# Patient Record
Sex: FEMALE | Race: WHITE | Employment: FULL TIME | URBAN - METROPOLITAN AREA
[De-identification: names, ages, dates, MRNs, and addresses within clinical notes are randomized per-mention and may not be internally consistent; named-entity substitution may affect disease eponyms.]

---

## 2018-05-21 PROBLEM — E03.9 ACQUIRED HYPOTHYROIDISM: Status: ACTIVE | Noted: 2018-05-21

## 2018-05-21 PROBLEM — G89.29 CHRONIC CERVICAL PAIN: Status: ACTIVE | Noted: 2018-05-21

## 2018-05-21 PROBLEM — M54.2 CHRONIC CERVICAL PAIN: Status: ACTIVE | Noted: 2018-05-21

## 2018-07-03 PROBLEM — E66.01 SEVERE OBESITY (BMI 35.0-39.9): Status: ACTIVE | Noted: 2018-07-03

## 2018-09-21 ENCOUNTER — HOSPITAL ENCOUNTER (OUTPATIENT)
Dept: MAMMOGRAPHY | Age: 58
Discharge: HOME | End: 2018-09-21
Attending: FAMILY MEDICINE
Payer: COMMERCIAL

## 2018-09-21 DIAGNOSIS — Z12.39 ENCOUNTER FOR SCREENING FOR MALIGNANT NEOPLASM OF BREAST: ICD-10-CM

## 2018-09-21 PROCEDURE — 77063 BREAST TOMOSYNTHESIS BI: CPT

## 2019-02-05 ENCOUNTER — HOSPITAL ENCOUNTER (INPATIENT)
Age: 59
LOS: 3 days | Discharge: HOME | DRG: 446 | End: 2019-02-08
Attending: EMERGENCY MEDICINE | Admitting: HOSPITALIST
Payer: COMMERCIAL

## 2019-02-05 ENCOUNTER — APPOINTMENT (OUTPATIENT)
Dept: ULTRASOUND IMAGING | Age: 59
DRG: 446 | End: 2019-02-05
Attending: EMERGENCY MEDICINE
Payer: COMMERCIAL

## 2019-02-05 ENCOUNTER — HOSPITAL ENCOUNTER (OUTPATIENT)
Dept: CT IMAGING | Age: 59
Discharge: HOME | End: 2019-02-05
Attending: FAMILY MEDICINE
Payer: COMMERCIAL

## 2019-02-05 DIAGNOSIS — K80.42 CHOLEDOCHOLITHIASIS WITH ACUTE CHOLECYSTITIS: Primary | ICD-10-CM

## 2019-02-05 DIAGNOSIS — R10.84 GENERALIZED ABDOMINAL PAIN: ICD-10-CM

## 2019-02-05 PROBLEM — R10.9 ABDOMINAL PAIN: Status: ACTIVE | Noted: 2019-02-05

## 2019-02-05 LAB
ALBUMIN SERPL-MCNC: 3.4 G/DL (ref 3.5–5)
ALBUMIN/GLOB SERPL: 0.9 {RATIO}
ALP SERPL-CCNC: 396 U/L (ref 50–130)
ALT SERPL-CCNC: 653 U/L (ref 12–65)
ANION GAP SERPL CALC-SCNC: 10 MMOL/L
AST SERPL-CCNC: 328 U/L (ref 15–37)
BASOPHILS # BLD: 0.1 K/UL (ref 0–0.2)
BASOPHILS NFR BLD: 1 % (ref 0–2)
BILIRUB SERPL-MCNC: 5.1 MG/DL (ref 0.2–1.1)
BUN SERPL-MCNC: 9 MG/DL (ref 6–23)
CALCIUM SERPL-MCNC: 9 MG/DL (ref 8.3–10.4)
CHLORIDE SERPL-SCNC: 103 MMOL/L (ref 98–107)
CO2 SERPL-SCNC: 26 MMOL/L (ref 21–32)
CREAT SERPL-MCNC: 0.75 MG/DL (ref 0.6–1)
DIFFERENTIAL METHOD BLD: ABNORMAL
EOSINOPHIL # BLD: 0.3 K/UL (ref 0–0.8)
EOSINOPHIL NFR BLD: 6 % (ref 0.5–7.8)
ERYTHROCYTE [DISTWIDTH] IN BLOOD BY AUTOMATED COUNT: 12.8 % (ref 11.9–14.6)
GLOBULIN SER CALC-MCNC: 3.7 G/DL (ref 2.3–3.5)
GLUCOSE SERPL-MCNC: 89 MG/DL (ref 65–100)
HCT VFR BLD AUTO: 45.9 % (ref 35.8–46.3)
HGB BLD-MCNC: 15.4 G/DL (ref 11.7–15.4)
IMM GRANULOCYTES # BLD AUTO: 0 K/UL (ref 0–0.5)
IMM GRANULOCYTES NFR BLD AUTO: 1 % (ref 0–5)
LIPASE SERPL-CCNC: 157 U/L (ref 73–393)
LYMPHOCYTES # BLD: 1.8 K/UL (ref 0.5–4.6)
LYMPHOCYTES NFR BLD: 30 % (ref 13–44)
MCH RBC QN AUTO: 32.8 PG (ref 26.1–32.9)
MCHC RBC AUTO-ENTMCNC: 33.6 G/DL (ref 31.4–35)
MCV RBC AUTO: 97.9 FL (ref 79.6–97.8)
MONOCYTES # BLD: 0.4 K/UL (ref 0.1–1.3)
MONOCYTES NFR BLD: 7 % (ref 4–12)
NEUTS SEG # BLD: 3.4 K/UL (ref 1.7–8.2)
NEUTS SEG NFR BLD: 56 % (ref 43–78)
NRBC # BLD: 0 K/UL (ref 0–0.2)
PLATELET # BLD AUTO: 256 K/UL (ref 150–450)
PMV BLD AUTO: 9.8 FL (ref 9.4–12.3)
POTASSIUM SERPL-SCNC: 3.9 MMOL/L (ref 3.5–5.1)
PROT SERPL-MCNC: 7.1 G/DL
RBC # BLD AUTO: 4.69 M/UL (ref 4.05–5.2)
SODIUM SERPL-SCNC: 139 MMOL/L (ref 136–145)
WBC # BLD AUTO: 6 K/UL (ref 4.3–11.1)

## 2019-02-05 PROCEDURE — 83690 ASSAY OF LIPASE: CPT

## 2019-02-05 PROCEDURE — 80053 COMPREHEN METABOLIC PANEL: CPT

## 2019-02-05 PROCEDURE — 76705 ECHO EXAM OF ABDOMEN: CPT

## 2019-02-05 PROCEDURE — 99283 EMERGENCY DEPT VISIT LOW MDM: CPT | Performed by: EMERGENCY MEDICINE

## 2019-02-05 PROCEDURE — 74011000258 HC RX REV CODE- 258: Performed by: FAMILY MEDICINE

## 2019-02-05 PROCEDURE — 74011000258 HC RX REV CODE- 258: Performed by: EMERGENCY MEDICINE

## 2019-02-05 PROCEDURE — 74011636320 HC RX REV CODE- 636/320: Performed by: FAMILY MEDICINE

## 2019-02-05 PROCEDURE — 85025 COMPLETE CBC W/AUTO DIFF WBC: CPT

## 2019-02-05 PROCEDURE — 74011250636 HC RX REV CODE- 250/636: Performed by: EMERGENCY MEDICINE

## 2019-02-05 PROCEDURE — 65270000029 HC RM PRIVATE

## 2019-02-05 PROCEDURE — 74011250636 HC RX REV CODE- 250/636: Performed by: HOSPITALIST

## 2019-02-05 PROCEDURE — 74177 CT ABD & PELVIS W/CONTRAST: CPT

## 2019-02-05 RX ORDER — DEXTROSE, SODIUM CHLORIDE, SODIUM LACTATE, POTASSIUM CHLORIDE, AND CALCIUM CHLORIDE 5; .6; .31; .03; .02 G/100ML; G/100ML; G/100ML; G/100ML; G/100ML
125 INJECTION, SOLUTION INTRAVENOUS CONTINUOUS
Status: DISCONTINUED | OUTPATIENT
Start: 2019-02-05 | End: 2019-02-06

## 2019-02-05 RX ORDER — NALOXONE HYDROCHLORIDE 0.4 MG/ML
0.4 INJECTION, SOLUTION INTRAMUSCULAR; INTRAVENOUS; SUBCUTANEOUS AS NEEDED
Status: DISCONTINUED | OUTPATIENT
Start: 2019-02-05 | End: 2019-02-06 | Stop reason: HOSPADM

## 2019-02-05 RX ORDER — ONDANSETRON 2 MG/ML
4 INJECTION INTRAMUSCULAR; INTRAVENOUS
Status: DISCONTINUED | OUTPATIENT
Start: 2019-02-05 | End: 2019-02-06 | Stop reason: HOSPADM

## 2019-02-05 RX ORDER — SODIUM CHLORIDE 0.9 % (FLUSH) 0.9 %
5-40 SYRINGE (ML) INJECTION EVERY 8 HOURS
Status: DISCONTINUED | OUTPATIENT
Start: 2019-02-05 | End: 2019-02-06 | Stop reason: HOSPADM

## 2019-02-05 RX ORDER — AMOXICILLIN 250 MG
1 CAPSULE ORAL DAILY
Status: DISCONTINUED | OUTPATIENT
Start: 2019-02-06 | End: 2019-02-06

## 2019-02-05 RX ORDER — OXYCODONE AND ACETAMINOPHEN 5; 325 MG/1; MG/1
1 TABLET ORAL
Status: DISCONTINUED | OUTPATIENT
Start: 2019-02-05 | End: 2019-02-06 | Stop reason: HOSPADM

## 2019-02-05 RX ORDER — SODIUM CHLORIDE 0.9 % (FLUSH) 0.9 %
10 SYRINGE (ML) INJECTION
Status: COMPLETED | OUTPATIENT
Start: 2019-02-05 | End: 2019-02-05

## 2019-02-05 RX ORDER — ACETAMINOPHEN 325 MG/1
650 TABLET ORAL
Status: DISCONTINUED | OUTPATIENT
Start: 2019-02-05 | End: 2019-02-06 | Stop reason: HOSPADM

## 2019-02-05 RX ORDER — MORPHINE SULFATE 2 MG/ML
1 INJECTION, SOLUTION INTRAMUSCULAR; INTRAVENOUS
Status: DISCONTINUED | OUTPATIENT
Start: 2019-02-05 | End: 2019-02-06 | Stop reason: HOSPADM

## 2019-02-05 RX ORDER — SODIUM CHLORIDE 0.9 % (FLUSH) 0.9 %
5-40 SYRINGE (ML) INJECTION AS NEEDED
Status: DISCONTINUED | OUTPATIENT
Start: 2019-02-05 | End: 2019-02-06 | Stop reason: HOSPADM

## 2019-02-05 RX ORDER — ENOXAPARIN SODIUM 100 MG/ML
40 INJECTION SUBCUTANEOUS EVERY 24 HOURS
Status: DISCONTINUED | OUTPATIENT
Start: 2019-02-05 | End: 2019-02-06 | Stop reason: HOSPADM

## 2019-02-05 RX ADMIN — IOPAMIDOL 100 ML: 755 INJECTION, SOLUTION INTRAVENOUS at 14:41

## 2019-02-05 RX ADMIN — DIATRIZOATE MEGLUMINE AND DIATRIZOATE SODIUM 15 ML: 660; 100 LIQUID ORAL; RECTAL at 14:41

## 2019-02-05 RX ADMIN — Medication 10 ML: at 21:47

## 2019-02-05 RX ADMIN — Medication 10 ML: at 14:41

## 2019-02-05 RX ADMIN — SODIUM CHLORIDE 100 ML: 900 INJECTION, SOLUTION INTRAVENOUS at 14:41

## 2019-02-05 RX ADMIN — PIPERACILLIN SODIUM,TAZOBACTAM SODIUM 4.5 G: 4; .5 INJECTION, POWDER, FOR SOLUTION INTRAVENOUS at 19:42

## 2019-02-05 RX ADMIN — SODIUM CHLORIDE, SODIUM LACTATE, POTASSIUM CHLORIDE, CALCIUM CHLORIDE, AND DEXTROSE MONOHYDRATE 125 ML/HR: 600; 310; 30; 20; 5 INJECTION, SOLUTION INTRAVENOUS at 21:53

## 2019-02-05 RX ADMIN — ENOXAPARIN SODIUM 40 MG: 40 INJECTION SUBCUTANEOUS at 21:47

## 2019-02-05 NOTE — ED TRIAGE NOTES
Pt in states she was sent from ct for her gallbladder. States she has had abdominal pain and nausea since Saturday.

## 2019-02-06 ENCOUNTER — APPOINTMENT (OUTPATIENT)
Dept: GENERAL RADIOLOGY | Age: 59
End: 2019-02-06
Attending: INTERNAL MEDICINE
Payer: COMMERCIAL

## 2019-02-06 ENCOUNTER — ANESTHESIA (OUTPATIENT)
Dept: ENDOSCOPY | Age: 59
End: 2019-02-06
Payer: COMMERCIAL

## 2019-02-06 ENCOUNTER — HOSPITAL ENCOUNTER (OUTPATIENT)
Age: 59
Setting detail: OUTPATIENT SURGERY
Discharge: OTHER INSTITUTION | End: 2019-02-06
Attending: INTERNAL MEDICINE | Admitting: INTERNAL MEDICINE
Payer: COMMERCIAL

## 2019-02-06 ENCOUNTER — ANESTHESIA EVENT (OUTPATIENT)
Dept: ENDOSCOPY | Age: 59
End: 2019-02-06
Payer: COMMERCIAL

## 2019-02-06 VITALS
SYSTOLIC BLOOD PRESSURE: 144 MMHG | HEART RATE: 72 BPM | TEMPERATURE: 97.7 F | RESPIRATION RATE: 14 BRPM | DIASTOLIC BLOOD PRESSURE: 86 MMHG | OXYGEN SATURATION: 94 %

## 2019-02-06 LAB
ALBUMIN SERPL-MCNC: 2.9 G/DL (ref 3.5–5)
ALBUMIN/GLOB SERPL: 0.9 {RATIO}
ALP SERPL-CCNC: 348 U/L (ref 50–130)
ALT SERPL-CCNC: 512 U/L (ref 12–65)
ANION GAP SERPL CALC-SCNC: 7 MMOL/L
AST SERPL-CCNC: 226 U/L (ref 15–37)
BILIRUB DIRECT SERPL-MCNC: 3.1 MG/DL
BILIRUB SERPL-MCNC: 4 MG/DL (ref 0.2–1.1)
BUN SERPL-MCNC: 10 MG/DL (ref 6–23)
CALCIUM SERPL-MCNC: 8.7 MG/DL (ref 8.3–10.4)
CHLORIDE SERPL-SCNC: 106 MMOL/L (ref 98–107)
CO2 SERPL-SCNC: 29 MMOL/L (ref 21–32)
CREAT SERPL-MCNC: 0.81 MG/DL (ref 0.6–1)
ERYTHROCYTE [DISTWIDTH] IN BLOOD BY AUTOMATED COUNT: 13 % (ref 11.9–14.6)
GLOBULIN SER CALC-MCNC: 3.1 G/DL (ref 2.3–3.5)
GLUCOSE SERPL-MCNC: 97 MG/DL (ref 65–100)
HCT VFR BLD AUTO: 44.8 % (ref 35.8–46.3)
HGB BLD-MCNC: 14.6 G/DL (ref 11.7–15.4)
MCH RBC QN AUTO: 32.1 PG (ref 26.1–32.9)
MCHC RBC AUTO-ENTMCNC: 32.6 G/DL (ref 31.4–35)
MCV RBC AUTO: 98.5 FL (ref 79.6–97.8)
NRBC # BLD: 0 K/UL (ref 0–0.2)
PLATELET # BLD AUTO: 220 K/UL (ref 150–450)
PMV BLD AUTO: 10.2 FL (ref 9.4–12.3)
POTASSIUM SERPL-SCNC: 3.6 MMOL/L (ref 3.5–5.1)
PROT SERPL-MCNC: 6 G/DL
RBC # BLD AUTO: 4.55 M/UL (ref 4.05–5.2)
SODIUM SERPL-SCNC: 142 MMOL/L (ref 136–145)
WBC # BLD AUTO: 5.4 K/UL (ref 4.3–11.1)

## 2019-02-06 PROCEDURE — 74011000250 HC RX REV CODE- 250

## 2019-02-06 PROCEDURE — 74330 X-RAY BILE/PANC ENDOSCOPY: CPT

## 2019-02-06 PROCEDURE — 80048 BASIC METABOLIC PNL TOTAL CA: CPT

## 2019-02-06 PROCEDURE — 74011250636 HC RX REV CODE- 250/636: Performed by: FAMILY MEDICINE

## 2019-02-06 PROCEDURE — 80076 HEPATIC FUNCTION PANEL: CPT

## 2019-02-06 PROCEDURE — 74011250637 HC RX REV CODE- 250/637: Performed by: INTERNAL MEDICINE

## 2019-02-06 PROCEDURE — 76040000026: Performed by: INTERNAL MEDICINE

## 2019-02-06 PROCEDURE — 85027 COMPLETE CBC AUTOMATED: CPT

## 2019-02-06 PROCEDURE — 77030012595 HC SPHNTOM BILI BSC -D: Performed by: INTERNAL MEDICINE

## 2019-02-06 PROCEDURE — 74011250637 HC RX REV CODE- 250/637: Performed by: HOSPITALIST

## 2019-02-06 PROCEDURE — 77030032490 HC SLV COMPR SCD KNE COVD -B: Performed by: INTERNAL MEDICINE

## 2019-02-06 PROCEDURE — 65270000029 HC RM PRIVATE

## 2019-02-06 PROCEDURE — 77030020263 HC SOL INJ SOD CL0.9% LFCR 1000ML

## 2019-02-06 PROCEDURE — 74011250636 HC RX REV CODE- 250/636: Performed by: ANESTHESIOLOGY

## 2019-02-06 PROCEDURE — 74011250636 HC RX REV CODE- 250/636: Performed by: HOSPITALIST

## 2019-02-06 PROCEDURE — 74011250636 HC RX REV CODE- 250/636

## 2019-02-06 PROCEDURE — 76060000033 HC ANESTHESIA 1 TO 1.5 HR: Performed by: INTERNAL MEDICINE

## 2019-02-06 PROCEDURE — 74011000258 HC RX REV CODE- 258: Performed by: HOSPITALIST

## 2019-02-06 PROCEDURE — 77030007288 HC DEV LOK BILI BSC -A: Performed by: INTERNAL MEDICINE

## 2019-02-06 PROCEDURE — 36415 COLL VENOUS BLD VENIPUNCTURE: CPT

## 2019-02-06 PROCEDURE — 74011636320 HC RX REV CODE- 636/320: Performed by: INTERNAL MEDICINE

## 2019-02-06 PROCEDURE — 77030009038 HC CATH BILI STN RTVR BSC -C: Performed by: INTERNAL MEDICINE

## 2019-02-06 RX ORDER — OXYCODONE AND ACETAMINOPHEN 5; 325 MG/1; MG/1
1 TABLET ORAL
Status: CANCELLED | OUTPATIENT
Start: 2019-02-06

## 2019-02-06 RX ORDER — SODIUM CHLORIDE 0.9 % (FLUSH) 0.9 %
5-40 SYRINGE (ML) INJECTION EVERY 8 HOURS
Status: DISCONTINUED | OUTPATIENT
Start: 2019-02-06 | End: 2019-02-06 | Stop reason: HOSPADM

## 2019-02-06 RX ORDER — DEXTROSE MONOHYDRATE AND SODIUM CHLORIDE 5; .9 G/100ML; G/100ML
125 INJECTION, SOLUTION INTRAVENOUS CONTINUOUS
Status: DISCONTINUED | OUTPATIENT
Start: 2019-02-06 | End: 2019-02-06 | Stop reason: HOSPADM

## 2019-02-06 RX ORDER — ONDANSETRON 2 MG/ML
INJECTION INTRAMUSCULAR; INTRAVENOUS AS NEEDED
Status: DISCONTINUED | OUTPATIENT
Start: 2019-02-06 | End: 2019-02-06 | Stop reason: HOSPADM

## 2019-02-06 RX ORDER — NALOXONE HYDROCHLORIDE 0.4 MG/ML
0.4 INJECTION, SOLUTION INTRAMUSCULAR; INTRAVENOUS; SUBCUTANEOUS AS NEEDED
Status: CANCELLED | OUTPATIENT
Start: 2019-02-06

## 2019-02-06 RX ORDER — OXYCODONE AND ACETAMINOPHEN 7.5; 325 MG/1; MG/1
1 TABLET ORAL
Status: DISCONTINUED | OUTPATIENT
Start: 2019-02-06 | End: 2019-02-08 | Stop reason: HOSPADM

## 2019-02-06 RX ORDER — METOCLOPRAMIDE HYDROCHLORIDE 5 MG/ML
5 INJECTION INTRAMUSCULAR; INTRAVENOUS ONCE
Status: COMPLETED | OUTPATIENT
Start: 2019-02-06 | End: 2019-02-06

## 2019-02-06 RX ORDER — ENOXAPARIN SODIUM 100 MG/ML
40 INJECTION SUBCUTANEOUS EVERY 24 HOURS
Status: CANCELLED | OUTPATIENT
Start: 2019-02-06

## 2019-02-06 RX ORDER — SODIUM CHLORIDE 0.9 % (FLUSH) 0.9 %
5-40 SYRINGE (ML) INJECTION EVERY 8 HOURS
Status: CANCELLED | OUTPATIENT
Start: 2019-02-06

## 2019-02-06 RX ORDER — ENOXAPARIN SODIUM 100 MG/ML
40 INJECTION SUBCUTANEOUS EVERY 24 HOURS
Status: DISCONTINUED | OUTPATIENT
Start: 2019-02-06 | End: 2019-02-08 | Stop reason: HOSPADM

## 2019-02-06 RX ORDER — ONDANSETRON 2 MG/ML
4 INJECTION INTRAMUSCULAR; INTRAVENOUS
Status: CANCELLED | OUTPATIENT
Start: 2019-02-06

## 2019-02-06 RX ORDER — SODIUM CHLORIDE, SODIUM LACTATE, POTASSIUM CHLORIDE, CALCIUM CHLORIDE 600; 310; 30; 20 MG/100ML; MG/100ML; MG/100ML; MG/100ML
100 INJECTION, SOLUTION INTRAVENOUS CONTINUOUS
Status: DISCONTINUED | OUTPATIENT
Start: 2019-02-06 | End: 2019-02-06 | Stop reason: HOSPADM

## 2019-02-06 RX ORDER — PROPOFOL 10 MG/ML
INJECTION, EMULSION INTRAVENOUS AS NEEDED
Status: DISCONTINUED | OUTPATIENT
Start: 2019-02-06 | End: 2019-02-06 | Stop reason: HOSPADM

## 2019-02-06 RX ORDER — DEXAMETHASONE SODIUM PHOSPHATE 4 MG/ML
INJECTION, SOLUTION INTRA-ARTICULAR; INTRALESIONAL; INTRAMUSCULAR; INTRAVENOUS; SOFT TISSUE AS NEEDED
Status: DISCONTINUED | OUTPATIENT
Start: 2019-02-06 | End: 2019-02-06 | Stop reason: HOSPADM

## 2019-02-06 RX ORDER — SODIUM CHLORIDE 9 MG/ML
100 INJECTION, SOLUTION INTRAVENOUS CONTINUOUS
Status: DISPENSED | OUTPATIENT
Start: 2019-02-06 | End: 2019-02-08

## 2019-02-06 RX ORDER — LIDOCAINE HYDROCHLORIDE 20 MG/ML
INJECTION, SOLUTION EPIDURAL; INFILTRATION; INTRACAUDAL; PERINEURAL AS NEEDED
Status: DISCONTINUED | OUTPATIENT
Start: 2019-02-06 | End: 2019-02-06 | Stop reason: HOSPADM

## 2019-02-06 RX ORDER — MORPHINE SULFATE 2 MG/ML
1 INJECTION, SOLUTION INTRAMUSCULAR; INTRAVENOUS
Status: CANCELLED | OUTPATIENT
Start: 2019-02-06

## 2019-02-06 RX ORDER — SUCCINYLCHOLINE CHLORIDE 20 MG/ML
INJECTION INTRAMUSCULAR; INTRAVENOUS AS NEEDED
Status: DISCONTINUED | OUTPATIENT
Start: 2019-02-06 | End: 2019-02-06 | Stop reason: HOSPADM

## 2019-02-06 RX ORDER — ACETAMINOPHEN 325 MG/1
650 TABLET ORAL
Status: CANCELLED | OUTPATIENT
Start: 2019-02-06

## 2019-02-06 RX ORDER — ONDANSETRON 2 MG/ML
4 INJECTION INTRAMUSCULAR; INTRAVENOUS
Status: DISCONTINUED | OUTPATIENT
Start: 2019-02-06 | End: 2019-02-08 | Stop reason: HOSPADM

## 2019-02-06 RX ORDER — SODIUM CHLORIDE 0.9 % (FLUSH) 0.9 %
5-40 SYRINGE (ML) INJECTION AS NEEDED
Status: DISCONTINUED | OUTPATIENT
Start: 2019-02-06 | End: 2019-02-06 | Stop reason: HOSPADM

## 2019-02-06 RX ORDER — FENTANYL CITRATE 50 UG/ML
INJECTION, SOLUTION INTRAMUSCULAR; INTRAVENOUS AS NEEDED
Status: DISCONTINUED | OUTPATIENT
Start: 2019-02-06 | End: 2019-02-06 | Stop reason: HOSPADM

## 2019-02-06 RX ORDER — ROCURONIUM BROMIDE 10 MG/ML
INJECTION, SOLUTION INTRAVENOUS AS NEEDED
Status: DISCONTINUED | OUTPATIENT
Start: 2019-02-06 | End: 2019-02-06 | Stop reason: HOSPADM

## 2019-02-06 RX ORDER — DEXTROSE MONOHYDRATE AND SODIUM CHLORIDE 5; .9 G/100ML; G/100ML
125 INJECTION, SOLUTION INTRAVENOUS CONTINUOUS
Status: CANCELLED | OUTPATIENT
Start: 2019-02-06

## 2019-02-06 RX ORDER — GABAPENTIN 100 MG/1
200 CAPSULE ORAL 3 TIMES DAILY
Status: CANCELLED | OUTPATIENT
Start: 2019-02-06

## 2019-02-06 RX ORDER — SODIUM CHLORIDE 0.9 % (FLUSH) 0.9 %
5-40 SYRINGE (ML) INJECTION AS NEEDED
Status: CANCELLED | OUTPATIENT
Start: 2019-02-06

## 2019-02-06 RX ORDER — MORPHINE SULFATE 4 MG/ML
2 INJECTION, SOLUTION INTRAMUSCULAR; INTRAVENOUS
Status: DISCONTINUED | OUTPATIENT
Start: 2019-02-06 | End: 2019-02-08 | Stop reason: HOSPADM

## 2019-02-06 RX ADMIN — LEVOTHYROXINE SODIUM 137 MCG: 112 TABLET ORAL at 05:57

## 2019-02-06 RX ADMIN — SUCCINYLCHOLINE CHLORIDE 140 MG: 20 INJECTION INTRAMUSCULAR; INTRAVENOUS at 13:16

## 2019-02-06 RX ADMIN — FENTANYL CITRATE 50 MCG: 50 INJECTION, SOLUTION INTRAMUSCULAR; INTRAVENOUS at 13:16

## 2019-02-06 RX ADMIN — ENOXAPARIN SODIUM 40 MG: 40 INJECTION SUBCUTANEOUS at 22:19

## 2019-02-06 RX ADMIN — IOPAMIDOL 12.5 ML: 755 INJECTION, SOLUTION INTRAVENOUS at 13:47

## 2019-02-06 RX ADMIN — DEXAMETHASONE SODIUM PHOSPHATE 4 MG: 4 INJECTION, SOLUTION INTRA-ARTICULAR; INTRALESIONAL; INTRAMUSCULAR; INTRAVENOUS; SOFT TISSUE at 13:30

## 2019-02-06 RX ADMIN — ONDANSETRON 4 MG: 2 INJECTION INTRAMUSCULAR; INTRAVENOUS at 20:20

## 2019-02-06 RX ADMIN — INDOMETHACIN 100 MG: 50 SUPPOSITORY RECTAL at 13:49

## 2019-02-06 RX ADMIN — ROCURONIUM BROMIDE 10 MG: 10 INJECTION, SOLUTION INTRAVENOUS at 13:16

## 2019-02-06 RX ADMIN — FENTANYL CITRATE 25 MCG: 50 INJECTION, SOLUTION INTRAMUSCULAR; INTRAVENOUS at 13:47

## 2019-02-06 RX ADMIN — PROPOFOL 200 MG: 10 INJECTION, EMULSION INTRAVENOUS at 13:16

## 2019-02-06 RX ADMIN — SODIUM CHLORIDE 100 ML/HR: 900 INJECTION, SOLUTION INTRAVENOUS at 19:00

## 2019-02-06 RX ADMIN — LEVOTHYROXINE SODIUM 25 MCG: 112 TABLET ORAL at 05:56

## 2019-02-06 RX ADMIN — LIDOCAINE HYDROCHLORIDE 80 MG: 20 INJECTION, SOLUTION EPIDURAL; INFILTRATION; INTRACAUDAL; PERINEURAL at 13:16

## 2019-02-06 RX ADMIN — METOCLOPRAMIDE 5 MG: 5 INJECTION, SOLUTION INTRAMUSCULAR; INTRAVENOUS at 20:20

## 2019-02-06 RX ADMIN — ONDANSETRON 4 MG: 2 INJECTION INTRAMUSCULAR; INTRAVENOUS at 13:30

## 2019-02-06 RX ADMIN — OXYCODONE HYDROCHLORIDE AND ACETAMINOPHEN 1 TABLET: 7.5; 325 TABLET ORAL at 19:34

## 2019-02-06 RX ADMIN — SODIUM CHLORIDE, SODIUM LACTATE, POTASSIUM CHLORIDE, AND CALCIUM CHLORIDE 100 ML/HR: 600; 310; 30; 20 INJECTION, SOLUTION INTRAVENOUS at 12:00

## 2019-02-06 RX ADMIN — PIPERACILLIN SODIUM,TAZOBACTAM SODIUM 3.38 G: 3; .375 INJECTION, POWDER, FOR SOLUTION INTRAVENOUS at 03:51

## 2019-02-06 RX ADMIN — PIPERACILLIN SODIUM,TAZOBACTAM SODIUM 4.5 G: 4; .5 INJECTION, POWDER, FOR SOLUTION INTRAVENOUS at 19:39

## 2019-02-06 RX ADMIN — FENTANYL CITRATE 25 MCG: 50 INJECTION, SOLUTION INTRAMUSCULAR; INTRAVENOUS at 13:38

## 2019-02-06 NOTE — ED PROVIDER NOTES
HPI: 
66 female here with not feeling well with abdominal discomfort and epigastric with associated nausea and vomiting for the past 4 days. No fever. Denies any chest pain, shortness of breath. ROS Constitutional: No fever, no chills Skin: no rash Eye: No vision changes ENMT: No sore throat Respiratory: No shortness of breath, no cough Cardiovascular: No chest pain, no palpitations Gastrointestinal: + vomiting, + nausea, no diarrhea, + abdominal pain : No dysuria MSK: No back pain, no muscle pain, no joint pain Neuro: No headache, no change in mental status, no numbness, no tingling, no weakness Psych:  
Endocrine:  
All other review of systems positive per history of present illness and the above otherwise negative or noncontributory. Visit Vitals /89 (BP 1 Location: Left arm, BP Patient Position: At rest) Pulse 95 Temp 98.6 °F (37 °C) Resp 19 Ht 5' 6\" (1.676 m) Wt 108 kg (238 lb) SpO2 95% BMI 38.41 kg/m² Past Medical History:  
Diagnosis Date  Thyroid disease Past Surgical History:  
Procedure Laterality Date  HX BUNIONECTOMY    
 left foot  HX LUMBAR DISKECTOMY  HX TONSILLECTOMY Prior to Admission Medications Prescriptions Last Dose Informant Patient Reported? Taking?  
gabapentin (NEURONTIN) 300 mg capsule   No Yes Si in am, 1 in afternoon, 2 at bedtime. Indications: NEUROPATHIC PAIN  
levothyroxine (SYNTHROID) 137 mcg tablet   No Yes Sig: Take 137 mcg by mouth Daily (before breakfast). Indications: hypothyroidism, New dose Facility-Administered Medications: None Adult Exam  
General: alert, no acute distress Head: normocephalic, atraumatic ENT: moist mucous membranes Jaundice appearing Neck: supple, non-tender; full range of motion Cardiovascular: regular rate and rhythm, normal peripheral perfusion, no edema Respiratory:  normal respirations; no wheezing, rales or rhonchi Gastrointestinal: soft, tenderness the epigastrium and right upper quadrant. no rebound or guarding, no peritoneal signs, no distension Back: non-tender, full range of motion Musculoskeletal: normal range of motion, normal strength, no gross deformities Neurological: alert and oriented x 4, no gross focal deficits; normal speech Psychiatric: cooperative; appropriate mood and affect MDM: she is jaundice apparent. A CT from the outpatient showing concern for cholecystitis. A follow-up ultrasound was obtained. Acute cholecystitis, and ultrasound with abnormal liver enzyme. 9 mm biliary duct. Spoke with Dr. Betsy Farah from general surgery. Recommend admission to the hospitalist team with GI consult for further intervention. No acute surgery today. Spoke with the hospitalist.  Patient will be admitted to his service. Will give Zosyn. Ct Abd Pelv W Cont Result Date: 2/5/2019 INDICATION:  Moderate to lines abdominal pain with nausea and vomiting for 5 days. COMPARISON EXAMS: None PROCEDURE: Axial imaging from chest base through the pelvis. There is oral contrast. There is 100mL Isovue-370 intravenously. FINDINGS: CHEST BASE: Lung bases are clear of any infiltrate. Small hiatal hernia. LIVER, PANCREAS, SPLEEN: Unremarkable GALLBLADDER: There is biliary distention with mild haziness in the gallbladder fossa worrisome for presence of cholecystitis. Is also suggestion of mild pericholecystic free fluid. In addition, there is mild degree of intrahepatic biliary dilation. Common bile duct level of the pancreatic head is measuring 12 mm. BOWEL AND MESENTERY: No free air or free fluid. Bowel is normal in caliber. No mesenteric adenopathy The appendix is unremarkable AORTA AND RETROPERITONEUM: Normal course and caliber of aorta. No retroperitoneal adenopathy KIDNEYS AND ADRENAL GLANDS: Bilateral adrenal glands unremarkable.  No hydronephrosis or nephrolithiasis. PELVIS : Bladder and uterine contour are unremarkable. Right ovary visualized unremarkable. BOWEL AND MESENTERY: No pelvic free air or free fluid. Bowel is normal in caliber. No pelvic or inguinal adenopathy MSK AND SOFT TISSUES: Normal vertebral body height and alignment IMPRESSION: Findings worrisome for presence of cholecystitis. A focal right upper quadrant ultrasound may be useful for further evaluation. Mild degree of biliary dilation. Correlate for presence of distal biliary obstruction Remainder of the exam is unremarkable These results were called to Dr. Aleida Beauchamp on 02/5/19 at 15:03hrs [id number] [facility] Result Date: 2/5/2019 HISTORY:Right upper quadrant pain, nausea and vomiting, 4 days duration with abnormal CT EXAM: Right upper quadrant ultrasound TECHNIQUE: Real-time grayscale and color Doppler imaging is performed in the longitudinal and transverse planes. No comparison. FINDINGS:There is normal hepatic echogenicity. .  There are dilated intrahepatic ducts. The common bile duct measures 9 mm. The right kidney measures 11 cm. There is gallbladder wall thickening with small pericholecystic fluid as well as gallstones. The aorta is normal.  The IVC is patent. IMPRESSION: Findings compatible with cholecystitis. There are also dilated intrahepatic and extrahepatic biliary ducts. Cannot exclude choledocholithiasis. Recent Results (from the past 24 hour(s)) CBC WITH AUTOMATED DIFF Collection Time: 02/05/19  4:10 PM  
Result Value Ref Range WBC 6.0 4.3 - 11.1 K/uL  
 RBC 4.69 4.05 - 5.2 M/uL  
 HGB 15.4 11.7 - 15.4 g/dL HCT 45.9 35.8 - 46.3 % MCV 97.9 (H) 79.6 - 97.8 FL  
 MCH 32.8 26.1 - 32.9 PG  
 MCHC 33.6 31.4 - 35.0 g/dL  
 RDW 12.8 11.9 - 14.6 % PLATELET 684 095 - 680 K/uL MPV 9.8 9.4 - 12.3 FL ABSOLUTE NRBC 0.00 0.0 - 0.2 K/uL  
 DF AUTOMATED NEUTROPHILS 56 43 - 78 % LYMPHOCYTES 30 13 - 44 %  MONOCYTES 7 4.0 - 12.0 %  
 EOSINOPHILS 6 0.5 - 7.8 % BASOPHILS 1 0.0 - 2.0 % IMMATURE GRANULOCYTES 1 0.0 - 5.0 %  
 ABS. NEUTROPHILS 3.4 1.7 - 8.2 K/UL  
 ABS. LYMPHOCYTES 1.8 0.5 - 4.6 K/UL  
 ABS. MONOCYTES 0.4 0.1 - 1.3 K/UL  
 ABS. EOSINOPHILS 0.3 0.0 - 0.8 K/UL  
 ABS. BASOPHILS 0.1 0.0 - 0.2 K/UL  
 ABS. IMM. GRANS. 0.0 0.0 - 0.5 K/UL METABOLIC PANEL, COMPREHENSIVE Collection Time: 02/05/19  4:10 PM  
Result Value Ref Range Sodium 139 136 - 145 mmol/L Potassium 3.9 3.5 - 5.1 mmol/L Chloride 103 98 - 107 mmol/L  
 CO2 26 21 - 32 mmol/L Anion gap 10 mmol/L Glucose 89 65 - 100 mg/dL BUN 9 6 - 23 MG/DL Creatinine 0.75 0.6 - 1.0 MG/DL  
 GFR est AA >60 >60 ml/min/1.73m2 GFR est non-AA >60 ml/min/1.73m2 Calcium 9.0 8.3 - 10.4 MG/DL Bilirubin, total 5.1 (H) 0.2 - 1.1 MG/DL  
 ALT (SGPT) 653 (H) 12 - 65 U/L  
 AST (SGOT) 328 (H) 15 - 37 U/L Alk. phosphatase 396 (H) 50 - 130 U/L Protein, total 7.1 g/dL Albumin 3.4 (L) 3.5 - 5.0 g/dL Globulin 3.7 (H) 2.3 - 3.5 g/dL A-G Ratio 0.9 LIPASE Collection Time: 02/05/19  4:10 PM  
Result Value Ref Range Lipase 157 73 - 393 U/L Dragon voice recognition software was used to create this note. Although the note has been reviewed and corrected where necessary, additional errors may have been overlooked and remain in the text.

## 2019-02-06 NOTE — PROGRESS NOTES
Pt readmitted to room after ERCP at [facility]. Alert, and oriented x 4. Pt up ad otoniel in room. Denies needs at this time. Call light in reach.

## 2019-02-06 NOTE — PROGRESS NOTES
TRANSFER - OUT REPORT: 
 
Verbal report given to Tammy Cuadra RN(name) on Shanice Asa  being transferred to [facility], (unit) for routine post - op Report consisted of patients Situation, Background, Assessment and  
Recommendations(SBAR). Information from the following report(s) SBAR was reviewed with the receiving nurse. Lines:    
 
Opportunity for questions and clarification was provided.

## 2019-02-06 NOTE — PROGRESS NOTES
Received report from Vinnie Orantes 82 @ 2040 SBAR and all other pertinant info. Pt oriented to room, call bell, TV, NPO, IVF started, @ 2115. She arrived via wheelchair. Remote Tel in place and activated at [phone] box # [id number]. At 0330 went to hang zosyn and it's non compatable with LR. Mariama Lynch Received order to change IVF to D5NS. Done and  Now running concurrently with primary IVF. Enc to call with any needs. Skin checked with [facility] and pt has a scar from lumbar sx at base of back & and a bunion removal of great left toe.   Matt Waldrop RN

## 2019-02-06 NOTE — ANESTHESIA POSTPROCEDURE EVALUATION
Procedure(s): ENDOSCOPIC RETROGRADE CHOLANGIOPANCREATOGRAPHY (ERCP) SFE room 324 ENDOSCOPIC SPHINCTEROTOMY 
ENDOSCOPIC STONE EXTRACTION/BALLOON SWEEP. Anesthesia Post Evaluation Multimodal analgesia: multimodal analgesia used between 6 hours prior to anesthesia start to PACU discharge Patient location during evaluation: PACU Patient participation: complete - patient participated Level of consciousness: awake Pain management: adequate Airway patency: patent Anesthetic complications: no 
Cardiovascular status: acceptable and hemodynamically stable Respiratory status: acceptable Hydration status: acceptable Comments: Acceptable for discharge from PACU. Visit Vitals /81 Pulse 69 Temp 36.5 °C (97.7 °F) Resp 14 SpO2 94%

## 2019-02-06 NOTE — PROGRESS NOTES
HOSPITALISTNAME:  Aneudy Darden Age:  66 y.o. 
:   1960 MRN:   [id number] PCP: Contreras Kirk DO Consulting MD: Treatment Team: Attending Provider: Agus Louise MD; Consulting Provider: Lashawn Marrero MD; Consulting Provider: Caitlyn Blackmon MD 
subj Patient is a 66years old female with pmhx of obesity, hypothyroidism presented with 4 days hx of abdominal pain a/w nausea/vomiting. Pt was in her usual health until recently. She started noticing abdominal pain, mainly in in RUQ and epigastric area, dull, spasmodic, 8/10 at it's worse (currently 2/10), initially was intermittent, but now is constant, non radiating, a/w nausea and vomiting. She denies fever, chills, rigors, diarrhea, skin rash, lightheadedness/dizziness, weakness/numbness in extremities. In ER, CTAP w contrast showed biliary distention with haziness in GB fossa suggestive of cholecystitis. USG abd showed CBD dilation of 9 mm with findings compatible with cholecystitis, dilated intrahepatic and extrahepatic biliary ducts noticed. Blood work was essentially unremarkable. Surgery was consulted, recommended no urgent surgical intervention, suggested to call GI for possible ERCP. Today, doing well after ercp and pain controlled Objective:  
 
Visit Vitals BP (P) 141/86 (BP 1 Location: Left arm, BP Patient Position: At rest) Pulse (P) 76 Temp (P) 98.4 °F (36.9 °C) Resp (P) 16 Ht 5' 6\" (1.676 m) Wt 108 kg (238 lb) SpO2 (P) 95% Breastfeeding? No  
BMI 38.41 kg/m² Temp (24hrs), Av.2 °F (36.8 °C), Min:97.7 °F (36.5 °C), Max:98.4 °F (36.9 °C) Oxygen Therapy O2 Sat (%): (P) 95 % (19) Pulse via Oximetry: 75 beats per minute (19) O2 Device: (P) Room air (19) Physical Exam: 
General:    Alert, cooperative, no distress, appears stated age. obese Lungs:   Clear to auscultation bilaterally. No Wheezing or Rhonchi. No rales. Heart:   Regular rate and rhythm,  no murmur, rub or gallop. Abdomen:   Soft, tender in epigastric region, no guarding, no rebound or rigidity, BS + Extremities: No cyanosis. No edema. No clubbing Skin:     Texture, turgor normal. No rashes or lesions. Not Jaundiced Neurologic: Alert and oriented x 3, no focal deficits Data Review:  
Recent Results (from the past 24 hour(s)) METABOLIC PANEL, BASIC Collection Time: 02/06/19  4:11 AM  
Result Value Ref Range Sodium 142 136 - 145 mmol/L Potassium 3.6 3.5 - 5.1 mmol/L Chloride 106 98 - 107 mmol/L  
 CO2 29 21 - 32 mmol/L Anion gap 7 mmol/L Glucose 97 65 - 100 mg/dL BUN 10 6 - 23 MG/DL Creatinine 0.81 0.6 - 1.0 MG/DL  
 GFR est AA >60 >60 ml/min/1.73m2 GFR est non-AA >60 ml/min/1.73m2 Calcium 8.7 8.3 - 10.4 MG/DL  
CBC W/O DIFF Collection Time: 02/06/19  4:11 AM  
Result Value Ref Range WBC 5.4 4.3 - 11.1 K/uL  
 RBC 4.55 4.05 - 5.2 M/uL  
 HGB 14.6 11.7 - 15.4 g/dL HCT 44.8 35.8 - 46.3 % MCV 98.5 (H) 79.6 - 97.8 FL  
 MCH 32.1 26.1 - 32.9 PG  
 MCHC 32.6 31.4 - 35.0 g/dL  
 RDW 13.0 11.9 - 14.6 % PLATELET 678 916 - 756 K/uL MPV 10.2 9.4 - 12.3 FL ABSOLUTE NRBC 0.00 0.0 - 0.2 K/uL HEPATIC FUNCTION PANEL Collection Time: 02/06/19  4:11 AM  
Result Value Ref Range Protein, total 6.0 g/dL Albumin 2.9 (L) 3.5 - 5.0 g/dL Globulin 3.1 2.3 - 3.5 g/dL A-G Ratio 0.9 Bilirubin, total 4.0 (H) 0.2 - 1.1 MG/DL Bilirubin, direct 3.1 (H) <0.4 MG/DL Alk. phosphatase 348 (H) 50 - 130 U/L  
 AST (SGOT) 226 (H) 15 - 37 U/L  
 ALT (SGPT) 512 (H) 12 - 65 U/L Imaging Loya Harada Hazel Abide All diagnostic imaging personally reviewed by me. USG abdomen: 
IMPRESSION:  
Findings compatible with cholecystitis. There are also dilated intrahepatic and 
extrahepatic biliary ducts. Cannot exclude choledocholithiasis.  
 
CTAP w contrast: 
IMPRESSION: 
 Findings worrisome for presence of cholecystitis. A focal right upper quadrant 
ultrasound may be useful for further evaluation. Mild degree of biliary dilation. Correlate for presence of distal biliary 
obstruction Assessment and Plan: Active Hospital Problems Diagnosis Date Noted  Choledocholithiasis with acute cholecystitis 02/05/2019  Abdominal pain 02/05/2019 PLAN Continue zosyn Ercp today removed 3 biliary stone Surgery consulted for GB removal 
Pain control 
ivf Npo after MN Signed By: Cliff Elias MD   
 February 6, 2019

## 2019-02-06 NOTE — ED NOTES
TRANSFER - OUT REPORT: 
 
Verbal report given on Media Marker  being transferred to medical (unit) for routine progression of care Report consisted of patients Situation, Background, Assessment and  
Recommendations(SBAR). Information from the following report(s) SBAR and ED Summary was reviewed with the receiving nurse. Lines:  
Peripheral IV 02/05/19 Right Antecubital (Active) Opportunity for questions and clarification was provided. Patient transported with: 
 [employer]

## 2019-02-06 NOTE — ANESTHESIA PREPROCEDURE EVALUATION
Anesthetic History No history of anesthetic complications Review of Systems / Medical History Patient summary reviewed and pertinent labs reviewed Pulmonary Within defined limits Neuro/Psych Comments: Neuropathy on Gabapentin Etoh abuse Cardiovascular Within defined limits Exercise tolerance: >4 METS 
  
GI/Hepatic/Renal 
  
 
 
 
Liver disease Comments: Elevated LFT's with cholelithiasis, CBD dilation Endo/Other Hypothyroidism Obesity Other Findings Physical Exam 
 
Airway Mallampati: II 
TM Distance: > 6 cm Neck ROM: normal range of motion Mouth opening: Normal 
 
 Cardiovascular Rhythm: regular Rate: normal 
 
 
 
 Dental 
No notable dental hx Pulmonary Breath sounds clear to auscultation Abdominal 
 
 
 
 Other Findings Anesthetic Plan ASA: 2 Anesthesia type: general 
 
 
 
 
 
Anesthetic plan and risks discussed with: Patient

## 2019-02-06 NOTE — CONSULTS
[facility] Consult Note       Consulting GI Physician: Dr. Brady Rob    Referring Provider:  Dr. Eros Rios    Consult Date:  2/6/2019    Admit Date:  2/5/2019    Chief Complaint:  Acute cholecystitis due to choledocholithiasis    Subjective:     History of Present Illness:  Patient is a 66 y.o. female with PMH significant for Thryoid disease, currently being seen in GI consultation at the request of Dr. Eros Rios for Acute cholecystitis due to choledocholithiasis. She was admitted to [facility] 2/5/19 after she presented with c/o RUQ, epigastric pain with N/V and found to have elevated liver enzymes with Tbili 5, Alk phos 396, ,  (previous LFTs from 10/2018 revealed elevated ALT 56, Alk phos 145 with normal AST, Tbili). Ct scan revealed cholecystitis with mild degree of biliary dilation, CBD 12mm. Subsequent Abdominal US revealed dilated intrahepatic duct with CBD at 9mm. Her WBC has been normal and she has been afebrile. PMH:  Past Medical History:   Diagnosis Date    Thyroid disease        PSH:  Past Surgical History:   Procedure Laterality Date    HX BUNIONECTOMY      left foot    HX LUMBAR DISKECTOMY      HX TONSILLECTOMY         Allergies:  No Known Allergies    Home Medications:  Prior to Admission medications    Medication Sig Start Date End Date Taking? Authorizing Provider   levothyroxine (SYNTHROID) 137 mcg tablet Take 137 mcg by mouth Daily (before breakfast). Indications: hypothyroidism, New dose 10/15/18  Yes Lombardi, Milena, DO   gabapentin (NEURONTIN) 300 mg capsule 1 in am, 1 in afternoon, 2 at bedtime.   Indications: NEUROPATHIC PAIN 5/21/18  Yes Steven Ledesma DO       Hospital Medications:  Current Facility-Administered Medications   Medication Dose Route Frequency    dextrose 5% and 0.9% NaCl infusion  125 mL/hr IntraVENous CONTINUOUS    levothyroxine (SYNTHROID) tablet 137 mcg  137 mcg Oral ACB    sodium chloride (NS) flush 5-40 mL  5-40 mL IntraVENous Q8H    sodium chloride (NS) flush 5-40 mL  5-40 mL IntraVENous PRN    acetaminophen (TYLENOL) tablet 650 mg  650 mg Oral Q6H PRN    oxyCODONE-acetaminophen (PERCOCET) 5-325 mg per tablet 1 Tab  1 Tab Oral Q6H PRN    morphine injection 1 mg  1 mg IntraVENous Q4H PRN    naloxone (NARCAN) injection 0.4 mg  0.4 mg IntraVENous PRN    ondansetron (ZOFRAN) injection 4 mg  4 mg IntraVENous Q4H PRN    enoxaparin (LOVENOX) injection 40 mg  40 mg SubCUTAneous Q24H    piperacillin-tazobactam (ZOSYN) 3.375 g in 0.9% sodium chloride (MBP/ADV) 100 mL  3.375 g IntraVENous Q8H       Social History:  Social History     Tobacco Use    Smoking status: Former Smoker    Smokeless tobacco: Former User   Substance Use Topics    Alcohol use: Yes       Pt denies any history of drug use, blood transfusions, or tattoos. Family History:  Family History   Problem Relation Age of Onset    No Known Problems Mother     No Known Problems Father     No Known Problems Sister     No Known Problems Brother     No Known Problems Maternal Aunt     No Known Problems Maternal Uncle     No Known Problems Paternal Aunt     No Known Problems Paternal Uncle     No Known Problems Maternal Grandmother     No Known Problems Maternal Grandfather     No Known Problems Paternal Grandmother     No Known Problems Paternal Grandfather     No Known Problems Other        Review of Systems:  A detailed 10 system ROS is obtained, with pertinent positives as listed above. All others are negative. Diet:  NPO    Objective:     Physical Exam:  Vitals:  Visit Vitals  /76 (BP 1 Location: Left arm, BP Patient Position: At rest)   Pulse 65   Temp 98.2 °F (36.8 °C)   Resp 16   Ht 5' 6\" (1.676 m)   Wt 108 kg (238 lb)   SpO2 96%   Breastfeeding? No   BMI 38.41 kg/m²     Gen:  Pt is alert, cooperative, no acute distress  Skin:  Extremities and face reveal no rashes. HEENT: Sclerae anicteric. Extra-occular muscles are intact.   No oral ulcers. No abnormal pigmentation of the lips. The neck is supple. Cardiovascular: Regular rate and rhythm. No murmurs, gallops, or rubs. Respiratory:  Comfortable breathing with no accessory muscle use. Clear breath sounds anteriorly with no wheezes, rales, or rhonchi. GI:  Abdomen nondistended, soft, and nontender. Normal active bowel sounds. No enlargement of the liver or spleen. No masses palpable. Rectal:  Deferred  Musculoskeletal:  No pitting edema of the lower legs. Neurological:  Gross memory appears intact. Patient is alert and oriented. Psychiatric:  Mood appears appropriate with judgement intact. Lymphatic:  No cervical or supraclavicular adenopathy. Laboratory:    Recent Labs     02/06/19  0411 02/05/19  1610   WBC 5.4 6.0   HGB 14.6 15.4   HCT 44.8 45.9    256   MCV 98.5* 97.9*    139   K 3.6 3.9    103   CO2 29 26   BUN 10 9   CREA 0.81 0.75   CA 8.7 9.0   GLU 97 89   AP  --  396*   SGOT  --  328*   ALT  --  653*   TBILI  --  5.1*   ALB  --  3.4*   TP  --  7.1   LPSE  --  157      CT abd/pelvis w contrast 2/5/19 for Moderate to lines abdominal pain with nausea and vomiting for 5days. COMPARISON EXAMS: None   PROCEDURE: Axial imaging from chest base through the pelvis. There is oral contrast. There is 100mL Isovue-370 intravenously. FINDINGS:  CHEST BASE: Lung bases are clear of any infiltrate. Small hiatal hernia. LIVER, PANCREAS, SPLEEN: Unremarkable  GALLBLADDER: There is biliary distention with mild haziness in the gallbladder  fossa worrisome for presence of cholecystitis. Is also suggestion of mild  pericholecystic free fluid. In addition, there is mild degree of intrahepatic  biliary dilation. Common bile duct level of the pancreatic head is measuring 12  mm. BOWEL AND MESENTERY: No free air or free fluid. Bowel is normal in caliber. No  mesenteric adenopathy  The appendix is unremarkable  AORTA AND RETROPERITONEUM: Normal course and caliber of aorta. No  retroperitoneal adenopathy  KIDNEYS AND ADRENAL GLANDS: Bilateral adrenal glands unremarkable. No  hydronephrosis or nephrolithiasis. PELVIS  : Bladder and uterine contour are unremarkable. Right ovary visualized  unremarkable. BOWEL AND MESENTERY: No pelvic free air or free fluid. Bowel is normal in  caliber. No pelvic or inguinal adenopathy  MSK AND SOFT TISSUES: Normal vertebral body height and alignment  IMPRESSION:  Findings worrisome for presence of cholecystitis. A focal right upper quadrant  ultrasound may be useful for further evaluation. Mild degree of biliary dilation. Correlate for presence of distal biliary  obstruction  Remainder of the exam is unremarkable    RUQ US 2/5/19  Right upper quadrant pain, nausea and vomiting, 4 days duration with abnormal CT  TECHNIQUE: Real-time grayscale and color Doppler imaging is performed in the longitudinal and transverse planes. No comparison. FINDINGS:There is normal hepatic echogenicity. .  There are dilated intrahepatic  ducts. The common bile duct measures 9 mm. The right kidney measures 11 cm. There is gallbladder wall thickening with small pericholecystic fluid as well as  gallstones. The aorta is normal.  The IVC is patent. IMPRESSION:   Findings compatible with cholecystitis. There are also dilated intrahepatic and  extrahepatic biliary ducts. Cannot exclude choledocholithiasis. Assessment:     Active Problems:    Choledocholithiasis with acute cholecystitis (2/5/2019)      Abdominal pain (2/5/2019)       66 y.o. female with PMH significant for Thryoid disease, being seen in GI consultation at the request of Dr. Gurpreet Hatch for Acute cholecystitis due to choledocholithiasis. She was admitted to [facility] 2/5/19 with c/o RUQ, epigastric pain, N/V with elevated LFTs (Tbili 5, Alk phos 396, , ). CT scan revealed cholecystitis with mild degree of biliary dilation, CBD 12mm.  Subsequent Abdominal US revealed dilated intrahepatic duct with CBD at 9mm. Her WBC has been normal and she has been afebrile. On admit she was started on IV Zosyn q8hrs. Plan:     -Pt would benefit from ERCP. Dr. Marielle Cota to see patient later today.

## 2019-02-06 NOTE — PROGRESS NOTES
Spiritual Care initial visit attempted by [employer]. Patient asleep and card left. ILAN Cox / Bereavement Coordinator

## 2019-02-06 NOTE — PROCEDURES
ERCP Procedure Note    Indications: Elevated LFT's, dilated bile duct, cholecystitis, epigastric/ RUQ pain    Anesthesia/Sedation: MAC IV     Pre-Procedure Physical:    Current Facility-Administered Medications   Medication Dose Route Frequency    lactated Ringers infusion  100 mL/hr IntraVENous CONTINUOUS    lactated Ringers infusion  100 mL/hr IntraVENous CONTINUOUS    sodium chloride (NS) flush 5-40 mL  5-40 mL IntraVENous Q8H    sodium chloride (NS) flush 5-40 mL  5-40 mL IntraVENous PRN    iopamidol (ISOVUE-370) 76 % injection 150 mL  150 mL IntraVENous ENDOSCOPY PRN    indomethacin (INDOCIN) rectal suppository 100 mg  100 mg Rectal ENDOSCOPY PRN    glucagon (GLUCAGEN) injection 0.5-1 mg  0.5-1 mg IntraVENous Multiple     Facility-Administered Medications Ordered in Other Encounters   Medication Dose Route Frequency    fentaNYL citrate (PF) injection   IntraVENous PRN    lidocaine (PF) (XYLOCAINE) 20 mg/mL (2 %) injection   IntraVENous PRN    propofol (DIPRIVAN) 10 mg/mL injection   IntraVENous PRN    rocuronium (ZEMURON) injection    PRN    succinylcholine (ANECTINE) injection    PRN    dexamethasone (DECADRON) 4 mg/mL injection    PRN    ondansetron (ZOFRAN) injection    PRN      Patient has no known allergies. Patient Vitals for the past 8 hrs:   BP Pulse SpO2   02/06/19 1120 143/79 67 94 %       Exam      Airway: clear   Heart: normal S1and S2    Lungs: clear bilateral  Abdomen: soft, nontender, bowel sounds present and normal in all quads   Mental Status: awake, alert and oriented to person, place and time          Procedure Details     Informed consent was obtained for the procedure, including conscious sedation. Risks of pancreatitis, infection, perforation, hemorrhage, adverse drug reaction and aspiration were discussed. The patient was placed in the prone position.   Based on the pre-procedure assessment, including review of the patient's medical history, medications, allergies, and review of systems, she had been deemed to be an appropriate candidate for conscious sedation; she was therefore sedated with the medications listed below. She was monitored continuously with ECG tracing, pulse oximetry, blood pressure monitoring, and direct observation. The side viewing duodenoscope was inserted through the mouth and advanced to the major duodenal papilla. The common bile duct was selectively cannulated with the 44 sphinctertome over a 125 jag wire. Cholangiogram taken. Sphincterotomy was performed. Balloon sweep of the bile duct was performed with both 9-12 and 12-15 mm balloon. Indomethacin 100 mg MI given. Findings:   Esophagus- Normal.  Stomach- Normal.  Duodenum- Normal.    Major duodenal papilla- Normal.  Common bile duct- Dilated to 14 mm; 3 medium size yellow stones removed by balloon sweep  Intrahepatic ducts- Markedly to the left near the spine  Pancreatic duct- Not entered. Gallbladder- Not visualized. Therapies: Sphincterotomy, stone extraction    Specimens: None    Estimated Blood Loss: 0 cc           Complications:   None; patient tolerated the procedure well. Attending Attestation:  I performed the procedure. Impression: Three medium size yellow stones removed from bile duct. Generous sphincterotomy performed.     Recommendations:  Consult surgery for cholecystectomy  Etoh abstinence

## 2019-02-06 NOTE — ROUTINE PROCESS
Vss. No complaints noted. Report called to receiving nurse at [place]. Pt transported via stretcher by [facility] ambulance.

## 2019-02-06 NOTE — H&P
HOSPITALIST H&P/CONSULTNAME:  Obed Munoz Age:  66 y.o. 
:   1960 MRN:   [id number] PCP: Baltazar Tavarez DO Consulting MD: Treatment Team: Attending Provider: Danyel Sams MD; Primary Nurse: Peri Ram RN 
HPI:  
Patient is a 66years old female with pmhx of obesity, hypothyroidism presented with 4 days hx of abdominal pain a/w nausea/vomiting. Pt was in her usual health until recently. She started noticing abdominal pain, mainly in in RUQ and epigastric area, dull, spasmodic, 8/10 at it's worse (currently 2/10), initially was intermittent, but now is constant, non radiating, a/w nausea and vomiting. She denies fever, chills, rigors, diarrhea, skin rash, lightheadedness/dizziness, weakness/numbness in extremities. In ER, CTAP w contrast showed biliary distention with haziness in GB fossa suggestive of cholecystitis. USG abd showed CBD dilation of 9 mm with findings compatible with cholecystitis, dilated intrahepatic and extrahepatic biliary ducts noticed. Blood work was essentially unremarkable. Surgery was consulted, recommended no urgent surgical intervention, suggested to call GI for possible ERCP. Complete ROS done and is as stated in HPI or otherwise negative Past Medical History:  
Diagnosis Date  Thyroid disease Past Surgical History:  
Procedure Laterality Date  HX BUNIONECTOMY    
 left foot  HX LUMBAR DISKECTOMY  HX TONSILLECTOMY Prior to Admission Medications Prescriptions Last Dose Informant Patient Reported? Taking?  
gabapentin (NEURONTIN) 300 mg capsule   No Yes Si in am, 1 in afternoon, 2 at bedtime. Indications: NEUROPATHIC PAIN  
levothyroxine (SYNTHROID) 137 mcg tablet   No Yes Sig: Take 137 mcg by mouth Daily (before breakfast). Indications: hypothyroidism, New dose Facility-Administered Medications: None No Known Allergies Social History Tobacco Use  Smoking status: Former Smoker  Smokeless tobacco: Former User Substance Use Topics  Alcohol use: Yes Family History Problem Relation Age of Onset  No Known Problems Mother  No Known Problems Father  No Known Problems Sister  No Known Problems Brother  No Known Problems Maternal Aunt  No Known Problems Maternal Uncle  No Known Problems Paternal Aunt  No Known Problems Paternal Uncle  No Known Problems Maternal Grandmother  No Known Problems Maternal Grandfather  No Known Problems Paternal Grandmother  No Known Problems Paternal Grandfather  No Known Problems Other Objective:  
 
Visit Vitals /89 (BP 1 Location: Left arm, BP Patient Position: At rest) Pulse 95 Temp 98.6 °F (37 °C) Resp 19 Ht 5' 6\" (1.676 m) Wt 108 kg (238 lb) SpO2 95% BMI 38.41 kg/m² Temp (24hrs), Av.6 °F (37 °C), Min:98.6 °F (37 °C), Max:98.6 °F (37 °C) Oxygen Therapy O2 Sat (%): 95 % (19 1544) O2 Device: Room air (19 154) Physical Exam: 
General:    Alert, cooperative, no distress, appears stated age. Head:   Normocephalic, without obvious abnormality, atraumatic. Nose:  Nares normal. No drainage or sinus tenderness. Lungs:   Clear to auscultation bilaterally. No Wheezing or Rhonchi. No rales. Heart:   Regular rate and rhythm,  no murmur, rub or gallop. Abdomen:   Soft, tender in epigastric region, no guarding, no rebound or rigidity, BS + Extremities: No cyanosis. No edema. No clubbing Skin:     Texture, turgor normal. No rashes or lesions. Not Jaundiced Neurologic: Alert and oriented x 3, no focal deficits Data Review:  
Recent Results (from the past 24 hour(s)) CBC WITH AUTOMATED DIFF Collection Time: 19  4:10 PM  
Result Value Ref Range WBC 6.0 4.3 - 11.1 K/uL  
 RBC 4.69 4.05 - 5.2 M/uL  
 HGB 15.4 11.7 - 15.4 g/dL HCT 45.9 35.8 - 46.3 %  MCV 97.9 (H) 79.6 - 97.8 FL  
 MCH 32.8 26.1 - 32.9 PG  
 MCHC 33.6 31.4 - 35.0 g/dL  
 RDW 12.8 11.9 - 14.6 % PLATELET 833 228 - 661 K/uL MPV 9.8 9.4 - 12.3 FL ABSOLUTE NRBC 0.00 0.0 - 0.2 K/uL  
 DF AUTOMATED NEUTROPHILS 56 43 - 78 % LYMPHOCYTES 30 13 - 44 % MONOCYTES 7 4.0 - 12.0 % EOSINOPHILS 6 0.5 - 7.8 % BASOPHILS 1 0.0 - 2.0 % IMMATURE GRANULOCYTES 1 0.0 - 5.0 %  
 ABS. NEUTROPHILS 3.4 1.7 - 8.2 K/UL  
 ABS. LYMPHOCYTES 1.8 0.5 - 4.6 K/UL  
 ABS. MONOCYTES 0.4 0.1 - 1.3 K/UL  
 ABS. EOSINOPHILS 0.3 0.0 - 0.8 K/UL  
 ABS. BASOPHILS 0.1 0.0 - 0.2 K/UL  
 ABS. IMM. GRANS. 0.0 0.0 - 0.5 K/UL METABOLIC PANEL, COMPREHENSIVE Collection Time: 02/05/19  4:10 PM  
Result Value Ref Range Sodium 139 136 - 145 mmol/L Potassium 3.9 3.5 - 5.1 mmol/L Chloride 103 98 - 107 mmol/L  
 CO2 26 21 - 32 mmol/L Anion gap 10 mmol/L Glucose 89 65 - 100 mg/dL BUN 9 6 - 23 MG/DL Creatinine 0.75 0.6 - 1.0 MG/DL  
 GFR est AA >60 >60 ml/min/1.73m2 GFR est non-AA >60 ml/min/1.73m2 Calcium 9.0 8.3 - 10.4 MG/DL Bilirubin, total 5.1 (H) 0.2 - 1.1 MG/DL  
 ALT (SGPT) 653 (H) 12 - 65 U/L  
 AST (SGOT) 328 (H) 15 - 37 U/L Alk. phosphatase 396 (H) 50 - 130 U/L Protein, total 7.1 g/dL Albumin 3.4 (L) 3.5 - 5.0 g/dL Globulin 3.7 (H) 2.3 - 3.5 g/dL A-G Ratio 0.9 LIPASE Collection Time: 02/05/19  4:10 PM  
Result Value Ref Range Lipase 157 73 - 393 U/L Imaging [facility] All diagnostic imaging personally reviewed by me. USG abdomen: 
IMPRESSION:  
Findings compatible with cholecystitis. There are also dilated intrahepatic and 
extrahepatic biliary ducts. Cannot exclude choledocholithiasis. CTAP w contrast: 
IMPRESSION: 
Findings worrisome for presence of cholecystitis. A focal right upper quadrant 
ultrasound may be useful for further evaluation. Mild degree of biliary dilation. Correlate for presence of distal biliary 
obstruction Assessment and Plan: Active Hospital Problems Diagnosis Date Noted  Choledocholithiasis with acute cholecystitis 02/05/2019  Abdominal pain 02/05/2019 PLAN 
· Admit inpatient for acute cholecystitis with choledocholithiasis · NPO stat · D5LR @ 125 cc/hr · Empiric IV zosyn q8h · F/u with blood culture · GI consult in AM 
· Pt is hemodynamically stable for now, continue to monitor on remote tele · Pain management with percocet and morphine prn · Prn tylenol for fever and mild pain · Prn zofran for nausea/vomiting · Stool softeners to prevent opioid induced constipation · DVT prophylaxis with lovenox Code Status: full High risk with opioids on board and cholecystitis Anticipated discharge: >2MN Signed By: Elyssa Murray MD   
 February 5, 2019

## 2019-02-06 NOTE — PROGRESS NOTES
. 
[facility] Consult Note Date: 2/6/2019 GI Problem: Elevated LFT's, dilated bile duct, cholecystitis, epigastric/ RUQ pain History of Present Illness:  Patient is a 66 y.o. female who is seen in consultation for Elevated LFT's, dilated bile duct, cholecystitis, epigastric/ RUQ pain. Hospital Medications: 
Current Facility-Administered Medications Medication Dose Route Frequency  lactated Ringers infusion  100 mL/hr IntraVENous CONTINUOUS  
 lactated Ringers infusion  100 mL/hr IntraVENous CONTINUOUS  
 sodium chloride (NS) flush 5-40 mL  5-40 mL IntraVENous Q8H  
 sodium chloride (NS) flush 5-40 mL  5-40 mL IntraVENous PRN  
 iopamidol (ISOVUE-370) 76 % injection 150 mL  150 mL IntraVENous ENDOSCOPY PRN  
 indomethacin (INDOCIN) rectal suppository 100 mg  100 mg Rectal ENDOSCOPY PRN  
 glucagon (GLUCAGEN) injection 0.5-1 mg  0.5-1 mg IntraVENous Multiple Objective:  
 
Physical Exam: 
Vitals: 
Visit Vitals /79 Pulse 67 SpO2 94% General: No acute distress. Skin:  Extremities and face reveal no rashes. No gusman erythema. No telangiectasias on the chest wall. HEENT: Sclerae anicteric. No oral ulcers. No abnormal pigmentation of the lips. The neck is supple. Cardiovascular: Regular rate and rhythm. No murmurs, gallops, or rubs. Respiratory:  Comfortable breathing  With no accessory muscle use. Clear breath sounds with no wheezes, rales, or rhonchi. GI:  Abdomen nondistended, soft, and nontender. Normal active bowel sounds. No enlargement of the liver or spleen. No masses palpable. Musculoskeletal:  No pitting edema of the lower legs. Extremities have good range of motion. Neurological:  Gross memory appears intact. Patient is alert and oriented. Psychiatric:  Mood appears appropriate with judgement intact. Lymphatic:  No cervical or supraclavicular adenopathy. Laboratory:   
Recent Labs 02/06/19 
0411 WBC 5.4  
RBC 4.55  
 HGB 14.6 HCT 44.8  Recent Labs 02/06/19 
0411 GLU 97   
K 3.6  CO2 29 BUN 10  
CREA 0.81 CA 8.7 No results for input(s): PTP, INR, APTT in the last 72 hours. No lab exists for component: INREXT Recent Labs 02/06/19 
0411 02/05/19 
1610 CBIL 3.1*  --   
SGOT 226* 328* * 396* ALB 2.9* 3.4* TP 6.0 7.1 LPSE  --  157 Assessment: A 66 y.o. female with Elevated LFT's, dilated bile duct, cholecystitis, epigastric/ RUQ pain Plan: ERCP Signed By: Millie Andujar MD   
 February 6, 2019

## 2019-02-06 NOTE — PROGRESS NOTES
Paged Dr Nora Billingsley to request surgery consult for pt per recommendation listed in notes from Dr Migdalia Charles.

## 2019-02-07 LAB
ALBUMIN SERPL-MCNC: 3 G/DL (ref 3.5–5)
ALBUMIN/GLOB SERPL: 0.8 {RATIO}
ALP SERPL-CCNC: 356 U/L (ref 50–136)
ALT SERPL-CCNC: 422 U/L (ref 12–65)
ANION GAP SERPL CALC-SCNC: 8 MMOL/L
AST SERPL-CCNC: 158 U/L (ref 15–37)
BILIRUB DIRECT SERPL-MCNC: 1.6 MG/DL
BILIRUB SERPL-MCNC: 2.2 MG/DL (ref 0.2–1.1)
BUN SERPL-MCNC: 12 MG/DL (ref 6–23)
CALCIUM SERPL-MCNC: 8.6 MG/DL (ref 8.3–10.4)
CHLORIDE SERPL-SCNC: 106 MMOL/L (ref 98–107)
CO2 SERPL-SCNC: 28 MMOL/L (ref 21–32)
CREAT SERPL-MCNC: 0.84 MG/DL (ref 0.6–1)
ERYTHROCYTE [DISTWIDTH] IN BLOOD BY AUTOMATED COUNT: 12.9 % (ref 11.9–14.6)
GLOBULIN SER CALC-MCNC: 3.6 G/DL (ref 2.3–3.5)
GLUCOSE SERPL-MCNC: 127 MG/DL (ref 65–100)
HCT VFR BLD AUTO: 49.1 % (ref 35.8–46.3)
HGB BLD-MCNC: 15.9 G/DL (ref 11.7–15.4)
MCH RBC QN AUTO: 31.9 PG (ref 26.1–32.9)
MCHC RBC AUTO-ENTMCNC: 32.4 G/DL (ref 31.4–35)
MCV RBC AUTO: 98.6 FL (ref 79.6–97.8)
NRBC # BLD: 0 K/UL (ref 0–0.2)
PLATELET # BLD AUTO: 264 K/UL (ref 150–450)
PMV BLD AUTO: 9.9 FL (ref 9.4–12.3)
POTASSIUM SERPL-SCNC: 3.6 MMOL/L (ref 3.5–5.1)
PROT SERPL-MCNC: 6.6 G/DL
RBC # BLD AUTO: 4.98 M/UL (ref 4.05–5.2)
SODIUM SERPL-SCNC: 142 MMOL/L (ref 136–145)
WBC # BLD AUTO: 9 K/UL (ref 4.3–11.1)

## 2019-02-07 PROCEDURE — 74011250636 HC RX REV CODE- 250/636: Performed by: FAMILY MEDICINE

## 2019-02-07 PROCEDURE — 74011250636 HC RX REV CODE- 250/636: Performed by: HOSPITALIST

## 2019-02-07 PROCEDURE — 82248 BILIRUBIN DIRECT: CPT

## 2019-02-07 PROCEDURE — 77030020263 HC SOL INJ SOD CL0.9% LFCR 1000ML

## 2019-02-07 PROCEDURE — 85027 COMPLETE CBC AUTOMATED: CPT

## 2019-02-07 PROCEDURE — 80053 COMPREHEN METABOLIC PANEL: CPT

## 2019-02-07 PROCEDURE — 65270000029 HC RM PRIVATE

## 2019-02-07 PROCEDURE — 36415 COLL VENOUS BLD VENIPUNCTURE: CPT

## 2019-02-07 PROCEDURE — 74011000258 HC RX REV CODE- 258: Performed by: HOSPITALIST

## 2019-02-07 RX ADMIN — ONDANSETRON 4 MG: 2 INJECTION INTRAMUSCULAR; INTRAVENOUS at 00:23

## 2019-02-07 RX ADMIN — PIPERACILLIN SODIUM,TAZOBACTAM SODIUM 3.38 G: 3; .375 INJECTION, POWDER, FOR SOLUTION INTRAVENOUS at 13:43

## 2019-02-07 RX ADMIN — PIPERACILLIN SODIUM,TAZOBACTAM SODIUM 3.38 G: 3; .375 INJECTION, POWDER, FOR SOLUTION INTRAVENOUS at 19:57

## 2019-02-07 RX ADMIN — ENOXAPARIN SODIUM 40 MG: 40 INJECTION SUBCUTANEOUS at 23:25

## 2019-02-07 NOTE — CONSULTS
[address], [address] [facility]  [phone]     History and Physical/Surgical Consult   Obed Munoz    Admit date: 2019    MRN: [id number]     : 1960     Age: 66 y.o.          2019 11:48 AM    Subjective/HPI:   This patient is a 66 y.o. seen and evaluated at the request of Dr. Rosalio Cerna. Patient admitted with abdominal pain was found to have cholecystitis with cholelithiasis as well as choledocholithiasis. She has undergone ERCP with removal of 3 small stones and sphincterotomy. Consult is for cholecystectomy. She has elevated liver function tests which are coming down. Also an elevated lipase. Currently today she is without abdominal pain and feels much better. Review of Systems  A comprehensive review of systems was negative except for that written in the HPI. Past Medical History:   Diagnosis Date    Thyroid disease       Past Surgical History:   Procedure Laterality Date    HX BUNIONECTOMY      left foot    HX LUMBAR DISKECTOMY      HX TONSILLECTOMY        No Known Allergies   Social History     Tobacco Use    Smoking status: Former Smoker    Smokeless tobacco: Former User   Substance Use Topics    Alcohol use: Yes      Social History     Social History Narrative    Not on file     Family History   Problem Relation Age of Onset    No Known Problems Mother     No Known Problems Father     No Known Problems Sister     No Known Problems Brother     No Known Problems Maternal Aunt     No Known Problems Maternal Uncle     No Known Problems Paternal Aunt     No Known Problems Paternal Uncle     No Known Problems Maternal Grandmother     No Known Problems Maternal Grandfather     No Known Problems Paternal Grandmother     No Known Problems Paternal Grandfather     No Known Problems Other       Prior to Admission Medications   Prescriptions Last Dose Informant Patient Reported?  Taking?   gabapentin (NEURONTIN) 300 mg capsule   No Yes   Si in am, 1 in afternoon, 2 at bedtime. Indications: NEUROPATHIC PAIN   levothyroxine (SYNTHROID) 137 mcg tablet   No Yes   Sig: Take 137 mcg by mouth Daily (before breakfast). Indications: hypothyroidism, New dose      Facility-Administered Medications: None     Current Facility-Administered Medications   Medication Dose Route Frequency    piperacillin-tazobactam (ZOSYN) 3.375 g in 0.9% sodium chloride (MBP/ADV) 100 mL  3.375 g IntraVENous Q8H    oxyCODONE-acetaminophen (PERCOCET 7.5) 7.5-325 mg per tablet 1 Tab  1 Tab Oral Q6H PRN    morphine injection 2 mg  2 mg IntraVENous Q4H PRN    0.9% sodium chloride infusion  100 mL/hr IntraVENous CONTINUOUS    enoxaparin (LOVENOX) injection 40 mg  40 mg SubCUTAneous Q24H    ondansetron (ZOFRAN) injection 4 mg  4 mg IntraVENous Q4H PRN     Objective:     Vitals:    19 2344 19 0340 19 0755 19 1136   BP: 132/84 134/85 150/85 151/85   Pulse: 80 73 74 78   Resp: 16 16 18 16   Temp: 98.9 °F (37.2 °C) 98.4 °F (36.9 °C) 97.9 °F (36.6 °C) 99.2 °F (37.3 °C)   SpO2: 91% 91% 92% 91%   Weight:       Height:          0701 -  1900  In: 2693 [I.V.:1493]  Out: -    1901 -  0700  In: 1350 [I.V.:1350]  Out: 1000 [Urine:1000]  Physical Exam:   Gen- the patient is well developed and in no acute distress  HEENT- PERRL, EOMI, no scleral icterus       nose without alar flaring or epistaxis                  oral muscosa moist without cyanosis  Neck- no JVD or retractions  Lungs- resp even/unlab   Heart- RRR   Abd- soft,distended  Ext- warm without cyanosis. There is no lower leg edema. Skin- no jaundice or rashes  Neuro- alert and oriented x 3. No gross sensorimotor deficits are present.      Data Review   Recent Labs     19  0359 19  0411 19  1610   WBC 9.0 5.4 6.0   HGB 15.9* 14.6 15.4   HCT 49.1* 44.8 45.9    220 256     Recent Labs     19  0359 19  0411 19  1610    142 139   K 3.6 3.6 3.9    106 103   CO2 28 29 26   * 97 89   BUN 12 10 9   CREA 0.84 0.81 0.75       Assessment:Cholecystitis, cholelithiasis with choledocholithiasis status post ERCP. Hospital Problems  Date Reviewed: 10/9/2018          Codes Class Noted POA    * (Principal) Choledocholithiasis with acute cholecystitis ICD-10-CM: K80.42  ICD-9-CM: 574.30  2/5/2019 Unknown        Abdominal pain ICD-10-CM: R10.9  ICD-9-CM: 789.00  2/5/2019 Unknown            Plan:She will need to have a cholecystectomy. This will be done as an outpatient. We have discussed this. Her liver functions as well as lipase are still elevated and in general she states she does not feel like proceeding with surgery at this point. She has had a sphincterotomy cystoscopy 5. Recovery will be in her best interest.  She is a candidate for robotic single site cholecystectomy. I have gone over the procedure with her in detail including the risk. Risks include injury, bile duct, possible open procedure, bile leak. She understands this. All questions were answered. It is found her to be discharged from a standpoint of view and my office will be contacting her estimated date to proceed. --    Thank you very much for this referral.  We appreciate the opportunity to participate in this patient's care. Will follow along with above stated plan. I have personally performed a face-to-face diagnostic evaluation and management  service on this patient. I have independently seen the patient. I have independently obtained the above history from the patient/family. I have independently examined the patient with above findings. I have independently reviewed data/labs for this patient and developed the above plan of care (MDM).     Dean Fang MD

## 2019-02-07 NOTE — PROGRESS NOTES
Trying to arrange for cholecystectomy tomorrow. Will discuss with patient later today [id number] [address] from surgery standpoint if patient eats today

## 2019-02-07 NOTE — PROGRESS NOTES
Care Management Interventions Mode of Transport at Discharge: Self Transition of Care Consult (CM Consult): Discharge Planning Current Support Network: Own Home Confirm Follow Up Transport: Family Discharge Location Discharge Placement: Home with family assistance Chart screened by  for discharge planning. No needs identified at this time. Please consult  if any new issues arise.

## 2019-02-07 NOTE — PROGRESS NOTES
Spiritual Care initial visit made by [facility] [employer]. Support given. Trevor mcgregor. [facility] / Bereavement Coordinator

## 2019-02-07 NOTE — PROGRESS NOTES
HOSPITALISTNAME:  Sherie Moura Age:  66 y.o. 
:   1960 MRN:   [id number] PCP: Karey Haji DO Consulting MD: Treatment Team: Attending Provider: Mamadou Coon MD; Consulting Provider: Krista Hanna MD; Consulting Provider: Adeline Jalloh MD; Consulting Provider: Javier Arias MD; Utilization Review: Alexei Camacho RN 
subj Patient is a 66years old female with pmhx of obesity, hypothyroidism presented with 4 days hx of abdominal pain a/w nausea/vomiting. Pt was in her usual health until recently. She started noticing abdominal pain, mainly in in RUQ and epigastric area, dull, spasmodic, 8/10 at it's worse (currently 2/10), initially was intermittent, but now is constant, non radiating, a/w nausea and vomiting. She denies fever, chills, rigors, diarrhea, skin rash, lightheadedness/dizziness, weakness/numbness in extremities. In ER, CTAP w contrast showed biliary distention with haziness in GB fossa suggestive of cholecystitis. USG abd showed CBD dilation of 9 mm with findings compatible with cholecystitis, dilated intrahepatic and extrahepatic biliary ducts noticed. Blood work was essentially unremarkable. Surgery was consulted, recommended no urgent surgical intervention, suggested to call GI for possible ERCP. 
2019: Patient sitting up in bed without complaint. Tolerated ERCP yesterday. Tolerating sips of water. Per GI, plans for cholecystectomy tomorrow. Improvement with total and direct bilirubin today 2.2/1.6. Also improvement with liver enzymes , , Alk phosphatase slightly higher today: 356. Will advance diet as tolerated today pending cholecystectomy tomorrow. Today, doing well after ercp and pain controlled Objective:  
 
Visit Vitals /85 Pulse 74 Temp 97.9 °F (36.6 °C) Resp 18 Ht 5' 6\" (1.676 m) Wt 108 kg (238 lb) SpO2 92% Breastfeeding? No  
BMI 38.41 kg/m² Temp (24hrs), Av.4 °F (36.9 °C), Min:97.7 °F (36.5 °C), Max:99.2 °F (37.3 °C) Oxygen Therapy O2 Sat (%): 92 % (19 0755) Pulse via Oximetry: 75 beats per minute (19) O2 Device: Room air (19 1530) Physical Exam: 
General:    Alert, cooperative, no distress, appears stated age. obese Lungs:   Clear to auscultation bilaterally. No Wheezing or Rhonchi. No rales. Heart:   Regular rate and rhythm,  no murmur, rub or gallop. Abdomen:   Soft, tender in epigastric region, no guarding, no rebound or rigidity, BS + Extremities: No cyanosis. No edema. No clubbing Skin:     Texture, turgor normal. No rashes or lesions. Not Jaundiced Neurologic: Alert and oriented x 3, no focal deficits Data Review:  
Recent Results (from the past 24 hour(s)) CBC W/O DIFF Collection Time: 19  3:59 AM  
Result Value Ref Range WBC 9.0 4.3 - 11.1 K/uL  
 RBC 4.98 4.05 - 5.2 M/uL  
 HGB 15.9 (H) 11.7 - 15.4 g/dL HCT 49.1 (H) 35.8 - 46.3 % MCV 98.6 (H) 79.6 - 97.8 FL  
 MCH 31.9 26.1 - 32.9 PG  
 MCHC 32.4 31.4 - 35.0 g/dL  
 RDW 12.9 11.9 - 14.6 % PLATELET 365 419 - 323 K/uL MPV 9.9 9.4 - 12.3 FL ABSOLUTE NRBC 0.00 0.0 - 0.2 K/uL METABOLIC PANEL, COMPREHENSIVE Collection Time: 19  3:59 AM  
Result Value Ref Range Sodium 142 136 - 145 mmol/L Potassium 3.6 3.5 - 5.1 mmol/L Chloride 106 98 - 107 mmol/L  
 CO2 28 21 - 32 mmol/L Anion gap 8 mmol/L Glucose 127 (H) 65 - 100 mg/dL BUN 12 6 - 23 MG/DL Creatinine 0.84 0.6 - 1.0 MG/DL  
 GFR est AA >60 >60 ml/min/1.73m2 GFR est non-AA >60 ml/min/1.73m2 Calcium 8.6 8.3 - 10.4 MG/DL Bilirubin, total 2.2 (H) 0.2 - 1.1 MG/DL  
 ALT (SGPT) 422 (H) 12 - 65 U/L  
 AST (SGOT) 158 (H) 15 - 37 U/L Alk. phosphatase 356 (H) 50 - 136 U/L Protein, total 6.6 g/dL Albumin 3.0 (L) 3.5 - 5.0 g/dL Globulin 3.6 (H) 2.3 - 3.5 g/dL A-G Ratio 0.8 BILIRUBIN, DIRECT  
 Collection Time: 02/07/19  3:59 AM  
Result Value Ref Range Bilirubin, direct 1.6 (H) <0.4 MG/DL Imaging [facility] All diagnostic imaging personally reviewed by me. USG abdomen: 
IMPRESSION:  
Findings compatible with cholecystitis. There are also dilated intrahepatic and 
extrahepatic biliary ducts. Cannot exclude choledocholithiasis. CTAP w contrast: 
IMPRESSION: 
Findings worrisome for presence of cholecystitis. A focal right upper quadrant 
ultrasound may be useful for further evaluation. Mild degree of biliary dilation. Correlate for presence of distal biliary 
obstruction Assessment and Plan: Active Hospital Problems Diagnosis Date Noted  Choledocholithiasis with acute cholecystitis 02/05/2019  Abdominal pain 02/05/2019 PLAN Continue zosyn Ercp 2/6 removed 3 biliary stone Surgery consulted for GB removal-may do tomorrow. Pain control 
ivf Signed By: Timo Sheldon NP February 7, 2019

## 2019-02-07 NOTE — PROGRESS NOTES
Patient is A&Ox4. Able to verbalize needs. Resting quietly with no distress noted. Neurovascular and peripheral vascular checks WNL. Voiding. Ambulates with steady gait. Medicated with percocet 7.5 mg po for C/O abdominal pain and cramping. Bed low and locked. Call light within reach. Instructed to call for assistance. Patient verbalizes understanding. Will monitor.

## 2019-02-08 VITALS
WEIGHT: 238 LBS | TEMPERATURE: 99.6 F | BODY MASS INDEX: 38.25 KG/M2 | HEART RATE: 90 BPM | RESPIRATION RATE: 16 BRPM | SYSTOLIC BLOOD PRESSURE: 151 MMHG | HEIGHT: 66 IN | OXYGEN SATURATION: 96 % | DIASTOLIC BLOOD PRESSURE: 85 MMHG

## 2019-02-08 LAB
ALBUMIN SERPL-MCNC: 2.6 G/DL (ref 3.5–5)
ALBUMIN/GLOB SERPL: 0.8 {RATIO}
ALP SERPL-CCNC: 255 U/L (ref 50–136)
ALT SERPL-CCNC: 266 U/L (ref 12–65)
ANION GAP SERPL CALC-SCNC: 7 MMOL/L
AST SERPL-CCNC: 91 U/L (ref 15–37)
BASOPHILS # BLD: 0 K/UL (ref 0–0.2)
BASOPHILS NFR BLD: 0 % (ref 0–2)
BILIRUB SERPL-MCNC: 1.8 MG/DL (ref 0.2–1.1)
BUN SERPL-MCNC: 6 MG/DL (ref 6–23)
CALCIUM SERPL-MCNC: 8 MG/DL (ref 8.3–10.4)
CHLORIDE SERPL-SCNC: 106 MMOL/L (ref 98–107)
CO2 SERPL-SCNC: 27 MMOL/L (ref 21–32)
CREAT SERPL-MCNC: 0.6 MG/DL (ref 0.6–1)
DIFFERENTIAL METHOD BLD: ABNORMAL
EOSINOPHIL # BLD: 0 K/UL (ref 0–0.8)
EOSINOPHIL NFR BLD: 0 % (ref 0.5–7.8)
ERYTHROCYTE [DISTWIDTH] IN BLOOD BY AUTOMATED COUNT: 13.1 % (ref 11.9–14.6)
GLOBULIN SER CALC-MCNC: 3.1 G/DL (ref 2.3–3.5)
GLUCOSE SERPL-MCNC: 113 MG/DL (ref 65–100)
HCT VFR BLD AUTO: 41.8 % (ref 35.8–46.3)
HGB BLD-MCNC: 13.9 G/DL (ref 11.7–15.4)
IMM GRANULOCYTES # BLD AUTO: 0.1 K/UL (ref 0–0.5)
IMM GRANULOCYTES NFR BLD AUTO: 1 % (ref 0–5)
LYMPHOCYTES # BLD: 1.1 K/UL (ref 0.5–4.6)
LYMPHOCYTES NFR BLD: 10 % (ref 13–44)
MCH RBC QN AUTO: 32.6 PG (ref 26.1–32.9)
MCHC RBC AUTO-ENTMCNC: 33.3 G/DL (ref 31.4–35)
MCV RBC AUTO: 97.9 FL (ref 79.6–97.8)
MONOCYTES # BLD: 0.7 K/UL (ref 0.1–1.3)
MONOCYTES NFR BLD: 6 % (ref 4–12)
NEUTS SEG # BLD: 9 K/UL (ref 1.7–8.2)
NEUTS SEG NFR BLD: 83 % (ref 43–78)
NRBC # BLD: 0 K/UL (ref 0–0.2)
PLATELET # BLD AUTO: 194 K/UL (ref 150–450)
PMV BLD AUTO: 10.2 FL (ref 9.4–12.3)
POTASSIUM SERPL-SCNC: 3.2 MMOL/L (ref 3.5–5.1)
PROT SERPL-MCNC: 5.7 G/DL
RBC # BLD AUTO: 4.27 M/UL (ref 4.05–5.2)
SODIUM SERPL-SCNC: 140 MMOL/L (ref 136–145)
WBC # BLD AUTO: 10.9 K/UL (ref 4.3–11.1)

## 2019-02-08 PROCEDURE — 74011250636 HC RX REV CODE- 250/636: Performed by: HOSPITALIST

## 2019-02-08 PROCEDURE — 36415 COLL VENOUS BLD VENIPUNCTURE: CPT

## 2019-02-08 PROCEDURE — 74011000258 HC RX REV CODE- 258: Performed by: HOSPITALIST

## 2019-02-08 PROCEDURE — 85025 COMPLETE CBC W/AUTO DIFF WBC: CPT

## 2019-02-08 PROCEDURE — 77030020263 HC SOL INJ SOD CL0.9% LFCR 1000ML

## 2019-02-08 PROCEDURE — 74011250637 HC RX REV CODE- 250/637: Performed by: INTERNAL MEDICINE

## 2019-02-08 PROCEDURE — 80053 COMPREHEN METABOLIC PANEL: CPT

## 2019-02-08 RX ORDER — ONDANSETRON 4 MG/1
4 TABLET, FILM COATED ORAL
Qty: 10 TAB | Refills: 0 | Status: ON HOLD | OUTPATIENT
Start: 2019-02-08 | End: 2019-05-27 | Stop reason: SDUPTHER

## 2019-02-08 RX ORDER — METRONIDAZOLE 500 MG/1
250 TABLET ORAL EVERY 8 HOURS
Status: DISCONTINUED | OUTPATIENT
Start: 2019-02-08 | End: 2019-02-08 | Stop reason: HOSPADM

## 2019-02-08 RX ORDER — TRAMADOL HYDROCHLORIDE 50 MG/1
50 TABLET ORAL
Qty: 20 TAB | Refills: 0 | Status: SHIPPED | OUTPATIENT
Start: 2019-02-08 | End: 2019-05-27

## 2019-02-08 RX ORDER — POTASSIUM CHLORIDE 20 MEQ/1
40 TABLET, EXTENDED RELEASE ORAL
Status: COMPLETED | OUTPATIENT
Start: 2019-02-08 | End: 2019-02-08

## 2019-02-08 RX ORDER — METRONIDAZOLE 250 MG/1
250 TABLET ORAL EVERY 8 HOURS
Qty: 21 TAB | Refills: 0 | Status: SHIPPED | OUTPATIENT
Start: 2019-02-08 | End: 2019-02-15

## 2019-02-08 RX ORDER — CEFPODOXIME PROXETIL 200 MG/1
200 TABLET, FILM COATED ORAL EVERY 12 HOURS
Status: DISCONTINUED | OUTPATIENT
Start: 2019-02-08 | End: 2019-02-08 | Stop reason: HOSPADM

## 2019-02-08 RX ORDER — CEFPODOXIME PROXETIL 200 MG/1
200 TABLET, FILM COATED ORAL EVERY 12 HOURS
Qty: 14 TAB | Refills: 0 | Status: SHIPPED | OUTPATIENT
Start: 2019-02-08 | End: 2019-02-15

## 2019-02-08 RX ADMIN — PIPERACILLIN SODIUM,TAZOBACTAM SODIUM 3.38 G: 3; .375 INJECTION, POWDER, FOR SOLUTION INTRAVENOUS at 11:42

## 2019-02-08 RX ADMIN — PIPERACILLIN SODIUM,TAZOBACTAM SODIUM 3.38 G: 3; .375 INJECTION, POWDER, FOR SOLUTION INTRAVENOUS at 04:55

## 2019-02-08 RX ADMIN — CEFPODOXIME PROXETIL 200 MG: 200 TABLET, FILM COATED ORAL at 12:41

## 2019-02-08 RX ADMIN — POTASSIUM CHLORIDE 40 MEQ: 20 TABLET, EXTENDED RELEASE ORAL at 12:41

## 2019-02-08 RX ADMIN — SODIUM CHLORIDE 100 ML/HR: 900 INJECTION, SOLUTION INTRAVENOUS at 04:56

## 2019-02-08 NOTE — PROGRESS NOTES
Discharge instructions reviewed with patient, opportunity for questions given.   Rx given for Vantin, Flagyl, Phenergan, and Ultram.

## 2019-02-08 NOTE — DISCHARGE SUMMARY
Physician Discharge Summary       Patient: Burgess Spencer MRN: [id number]  SSN: [id number]    YOB: 1960  Age: 66 y.o. Sex: female    PCP: Charmayne Herter, DO    Allergies: Patient has no known allergies. Admit date: 2/5/2019  Admitting Provider: Elyssa Murray MD    Discharge date: 2/8/2019  Discharging Provider: Basil Lopez MD    * Admission Diagnoses: Choledocholithiasis with acute cholecystitis [K80.42]  Abdominal pain [R10.9]    * Discharge Diagnoses:    Hospital Problems as of 2/8/2019 Date Reviewed: 10/9/2018          Codes Class Noted - Resolved POA    * (Principal) Choledocholithiasis with acute cholecystitis ICD-10-CM: K80.42  ICD-9-CM: 574.30  2/5/2019 - Present Unknown        Abdominal pain ICD-10-CM: R10.9  ICD-9-CM: 789.00  2/5/2019 - Present Unknown              * Hospital Course:    Ms. Saumya Grimes is a 67 yo WF who presented 2/5 for several days of right upper quadrant abdominal pain associated with nausea and vomiting. She is found to have acute cholecystitis and was started on zosyn. ERCP with GI performed 2/6 with findings consistent with stone removal.  She is now tolerating a full liquid diet and is having bowel movements. The plan is to have a lap cholecystectomy as an outpatient with Dr. Denisha Royal. She has been instructed not to drive while taking narcotics. She will be discharged on oral vantin and flagyl to complete her antibiotic course and is medically stable for discharge. * Procedures:     ERCP     HISTORY: Dilated bile ducts. Choledocholithiasis. Elevated LFTs.    A fluoroscopic images from ERCP were submitted, performed by Dr. Ian Dobbs.     COMPARISON: None. Correlation is made to the abdominal CT and right upper  quadrant ultrasound 02/05/2019.     FINDINGS: There was opacification of the common hepatic and common bile ducts as  well as left hepatic ducts. There was mild extrahepatic biliary ductal  dilatation.  There were a few mobile filling defects including one of which  resides at the common hepatic duct bifurcation. These presumably represent  stones as as there was reportedly the visualization of stones during the exam.  The final image reveals no persistent filling defect. A papillotomy was  performed. The pancreatic duct was not opacified nor were the right hepatic  ducts.     IMPRESSION  IMPRESSION: Choledocholithiasis and mild ductal dilatation. Please refer to the  procedural report for further description and detail. Consults: GI and General Surgery    Significant Diagnostic Studies:     INDICATION:  Moderate to lines abdominal pain with nausea and vomiting for 5  days.      COMPARISON EXAMS: None      PROCEDURE: Axial imaging from chest base through the pelvis. There is oral  contrast. There is 100mL Isovue-370 intravenously.      FINDINGS:     CHEST BASE: Lung bases are clear of any infiltrate. Small hiatal hernia.     LIVER, PANCREAS, SPLEEN: Unremarkable     GALLBLADDER: There is biliary distention with mild haziness in the gallbladder  fossa worrisome for presence of cholecystitis. Is also suggestion of mild  pericholecystic free fluid. In addition, there is mild degree of intrahepatic  biliary dilation. Common bile duct level of the pancreatic head is measuring 12  mm.     BOWEL AND MESENTERY: No free air or free fluid. Bowel is normal in caliber. No  mesenteric adenopathy  The appendix is unremarkable     AORTA AND RETROPERITONEUM: Normal course and caliber of aorta. No  retroperitoneal adenopathy     KIDNEYS AND ADRENAL GLANDS: Bilateral adrenal glands unremarkable. No  hydronephrosis or nephrolithiasis.     PELVIS     : Bladder and uterine contour are unremarkable. Right ovary visualized  unremarkable.      BOWEL AND MESENTERY: No pelvic free air or free fluid. Bowel is normal in  caliber.  No pelvic or inguinal adenopathy     MSK AND SOFT TISSUES: Normal vertebral body height and alignment     IMPRESSION  IMPRESSION:  Findings worrisome for presence of cholecystitis. A focal right upper quadrant  ultrasound may be useful for further evaluation. Mild degree of biliary dilation. Correlate for presence of distal biliary  obstruction  Remainder of the exam is unremarkable     HISTORY:Right upper quadrant pain, nausea and vomiting, 4 days duration with  abnormal CT     EXAM: Right upper quadrant ultrasound     TECHNIQUE: Real-time grayscale and color Doppler imaging is performed in the  longitudinal and transverse planes.     No comparison.     FINDINGS:There is normal hepatic echogenicity. .  There are dilated intrahepatic  ducts. The common bile duct measures 9 mm. The right kidney measures 11 cm.      There is gallbladder wall thickening with small pericholecystic fluid as well as  gallstones.     The aorta is normal.  The IVC is patent.     IMPRESSION  IMPRESSION:   Findings compatible with cholecystitis. There are also dilated intrahepatic and  extrahepatic biliary ducts. Cannot exclude choledocholithiasis. Discharge Exam:    General; awake, alert, oriented, obese  Eyes; non icteric, EOMI  Neck; supple  CV: RRR  Pulm; CTAB  Abd; soft, non distended, active BS, mildly tender in RUQ  Psych: good judgement, oriented, normal affect    * Discharge Condition: stable  * Disposition: Home    Discharge Medications:  Current Discharge Medication List      START taking these medications    Details   cefpodoxime (VANTIN) 200 mg tablet Take 1 Tab by mouth every twelve (12) hours for 7 days. Qty: 14 Tab, Refills: 0      metroNIDAZOLE (FLAGYL) 250 mg tablet Take 1 Tab by mouth every eight (8) hours for 7 days. Qty: 21 Tab, Refills: 0      ondansetron hcl (ZOFRAN) 4 mg tablet Take 1 Tab by mouth every eight (8) hours as needed for Nausea. Qty: 10 Tab, Refills: 0      traMADol (ULTRAM) 50 mg tablet Take 1 Tab by mouth every six (6) hours as needed for Pain. Max Daily Amount: 200 mg.   Qty: 20 Tab, Refills: 0    Associated Diagnoses: Choledocholithiasis with acute cholecystitis         CONTINUE these medications which have NOT CHANGED    Details   levothyroxine (SYNTHROID) 137 mcg tablet Take 137 mcg by mouth Daily (before breakfast). Indications: hypothyroidism, New dose  Qty: 90 Tab, Refills: 1    Associated Diagnoses: Acquired hypothyroidism      gabapentin (NEURONTIN) 300 mg capsule 1 in am, 1 in afternoon, 2 at bedtime. Indications: NEUROPATHIC PAIN  Qty: 120 Cap, Refills: 3    Associated Diagnoses: Chronic cervical pain             * Follow-up Care/Patient Instructions: Activity: no driving while on narcotics  Diet: gi bland    Follow-up Information     Follow up With Specialties Details Why Contact Info    Jing Lao DO Family Practice Schedule an appointment as soon as possible for a visit in 1 week  [address] [place] [address]          35 minutes spent in discharge planning and coordination of care.     Signed:  Leah Alberto MD  2/8/2019  12:15 PM

## 2019-02-08 NOTE — PROGRESS NOTES
Patient asking to speak to  about transportation and sitter support after her outpatient cholecystectomy  that will be scheduled in the near future. Patient lives in town without any family assistance. Her sister had instructed her to ask a hospital  for any assistance available since it is too expensive for family to travel here. I explained that sitter support/transportation is not usually covered by ins but she could pay privately for this service. I gave her brochures for several local sitter agencies that offer transport help home to and from hospital at a reasonable rate. I also encouraged her to call her ins provider to see if she has any non-emergent transport help as her [employer] policy is new to her. Patient also asking about Meals on Wheels. I discussed their criteria. Patient is indep with care and able to drive self to the grocery store. Delfino Pagan

## 2019-02-08 NOTE — PROGRESS NOTES
Patient is A&Ox4. Able to verbalize needs. Resting quietly with no distress noted. Neurovascular and peripheral vascular checks WNL. Voiding clear yellow urine. Up ad otoniel. Denies needs. Bed low and locked. Call light within reach. Instructed to call for assistance. Patient verbalizes understanding. Will monitor.

## 2019-02-08 NOTE — DISCHARGE INSTRUCTIONS
Activity: no driving while on narcotics  Diet: gi bland      DISCHARGE SUMMARY from Nurse    PATIENT INSTRUCTIONS:    After general anesthesia or intravenous sedation, for 24 hours or while taking prescription Narcotics:  · Limit your activities  · Do not drive and operate hazardous machinery  · Do not make important personal or business decisions  · Do  not drink alcoholic beverages  · If you have not urinated within 8 hours after discharge, please contact your surgeon on call. Report the following to your surgeon:  · Excessive pain, swelling, redness or odor of or around the surgical area  · Temperature over 100.5  · Nausea and vomiting lasting longer than 4 hours or if unable to take medications  · Any signs of decreased circulation or nerve impairment to extremity: change in color, persistent  numbness, tingling, coldness or increase pain  · Any questions    What to do at Home:    *  Please give a list of your current medications to your Primary Care Provider. *  Please update this list whenever your medications are discontinued, doses are      changed, or new medications (including over-the-counter products) are added. *  Please carry medication information at all times in case of emergency situations. These are general instructions for a healthy lifestyle:    No smoking/ No tobacco products/ Avoid exposure to second hand smoke  Surgeon General's Warning:  Quitting smoking now greatly reduces serious risk to your health. Obesity, smoking, and sedentary lifestyle greatly increases your risk for illness    A healthy diet, regular physical exercise & weight monitoring are important for maintaining a healthy lifestyle    You may be retaining fluid if you have a history of heart failure or if you experience any of the following symptoms:  Weight gain of 3 pounds or more overnight or 5 pounds in a week, increased swelling in our hands or feet or shortness of breath while lying flat in bed.   Please call your doctor as soon as you notice any of these symptoms; do not wait until your next office visit. Recognize signs and symptoms of STROKE:    F-face looks uneven    A-arms unable to move or move unevenly    S-speech slurred or non-existent    T-time-call 911 as soon as signs and symptoms begin-DO NOT go       Back to bed or wait to see if you get better-TIME IS BRAIN. Warning Signs of HEART ATTACK     Call 911 if you have these symptoms:   Chest discomfort. Most heart attacks involve discomfort in the center of the chest that lasts more than a few minutes, or that goes away and comes back. It can feel like uncomfortable pressure, squeezing, fullness, or pain.  Discomfort in other areas of the upper body. Symptoms can include pain or discomfort in one or both arms, the back, neck, jaw, or stomach.  Shortness of breath with or without chest discomfort.  Other signs may include breaking out in a cold sweat, nausea, or lightheadedness. Don't wait more than five minutes to call 911 - MINUTES MATTER! Fast action can save your life. Calling 911 is almost always the fastest way to get lifesaving treatment. Emergency Medical Services staff can begin treatment when they arrive -- up to an hour sooner than if someone gets to the hospital by car. The discharge information has been reviewed with the patient. The patient verbalized understanding. Discharge medications reviewed with the patient and appropriate educational materials and side effects teaching were provided.   ___________________________________________________________________________________________________________________________________

## 2019-02-08 NOTE — PROGRESS NOTES
Patient in bed. Lungs clear to auscultation. Hypoactive bowel sounds to all 4 quadrants, abdomen distended. Passing flatus. No pain at this time. Instructed to call for assistance or any needs. Patient verbalized understanding. Call bell within reach. Side rails up x3. Bed low and locked. No distress noted.

## 2019-05-24 ENCOUNTER — HOSPITAL ENCOUNTER (INPATIENT)
Age: 59
LOS: 3 days | Discharge: HOME | DRG: 419 | End: 2019-05-27
Attending: EMERGENCY MEDICINE | Admitting: INTERNAL MEDICINE
Payer: COMMERCIAL

## 2019-05-24 ENCOUNTER — ANESTHESIA EVENT (OUTPATIENT)
Dept: ENDOSCOPY | Age: 59
DRG: 419 | End: 2019-05-24
Payer: COMMERCIAL

## 2019-05-24 ENCOUNTER — APPOINTMENT (OUTPATIENT)
Dept: ULTRASOUND IMAGING | Age: 59
DRG: 419 | End: 2019-05-24
Attending: EMERGENCY MEDICINE
Payer: COMMERCIAL

## 2019-05-24 ENCOUNTER — APPOINTMENT (OUTPATIENT)
Dept: MRI IMAGING | Age: 59
DRG: 419 | End: 2019-05-24
Attending: INTERNAL MEDICINE
Payer: COMMERCIAL

## 2019-05-24 DIAGNOSIS — K80.00 CALCULUS OF GALLBLADDER WITH ACUTE CHOLECYSTITIS WITHOUT OBSTRUCTION: Primary | ICD-10-CM

## 2019-05-24 PROBLEM — K81.0 ACUTE CHOLECYSTITIS: Status: ACTIVE | Noted: 2019-05-24

## 2019-05-24 LAB
ALBUMIN SERPL-MCNC: 3.6 G/DL (ref 3.5–5)
ALBUMIN/GLOB SERPL: 1.1 {RATIO} (ref 1.2–3.5)
ALP SERPL-CCNC: 265 U/L (ref 50–136)
ALT SERPL-CCNC: 590 U/L (ref 12–65)
ANION GAP SERPL CALC-SCNC: 8 MMOL/L (ref 7–16)
AST SERPL-CCNC: 946 U/L (ref 15–37)
BASOPHILS # BLD: 0 K/UL (ref 0–0.2)
BASOPHILS NFR BLD: 1 % (ref 0–2)
BILIRUB SERPL-MCNC: 1.7 MG/DL (ref 0.2–1.1)
BUN SERPL-MCNC: 9 MG/DL (ref 6–23)
CALCIUM SERPL-MCNC: 9 MG/DL (ref 8.3–10.4)
CHLORIDE SERPL-SCNC: 103 MMOL/L (ref 98–107)
CO2 SERPL-SCNC: 29 MMOL/L (ref 21–32)
CREAT SERPL-MCNC: 0.75 MG/DL (ref 0.6–1)
DIFFERENTIAL METHOD BLD: ABNORMAL
EOSINOPHIL # BLD: 0.1 K/UL (ref 0–0.8)
EOSINOPHIL NFR BLD: 1 % (ref 0.5–7.8)
ERYTHROCYTE [DISTWIDTH] IN BLOOD BY AUTOMATED COUNT: 13.2 % (ref 11.9–14.6)
GLOBULIN SER CALC-MCNC: 3.3 G/DL (ref 2.3–3.5)
GLUCOSE SERPL-MCNC: 106 MG/DL (ref 65–100)
HCT VFR BLD AUTO: 47.4 % (ref 35.8–46.3)
HGB BLD-MCNC: 15.8 G/DL (ref 11.7–15.4)
IMM GRANULOCYTES # BLD AUTO: 0 K/UL (ref 0–0.5)
IMM GRANULOCYTES NFR BLD AUTO: 1 % (ref 0–5)
LIPASE SERPL-CCNC: 164 U/L (ref 73–393)
LYMPHOCYTES # BLD: 1.4 K/UL (ref 0.5–4.6)
LYMPHOCYTES NFR BLD: 22 % (ref 13–44)
MCH RBC QN AUTO: 32.2 PG (ref 26.1–32.9)
MCHC RBC AUTO-ENTMCNC: 33.3 G/DL (ref 31.4–35)
MCV RBC AUTO: 96.5 FL (ref 79.6–97.8)
MONOCYTES # BLD: 0.4 K/UL (ref 0.1–1.3)
MONOCYTES NFR BLD: 6 % (ref 4–12)
NEUTS SEG # BLD: 4.5 K/UL (ref 1.7–8.2)
NEUTS SEG NFR BLD: 70 % (ref 43–78)
NRBC # BLD: 0 K/UL (ref 0–0.2)
PLATELET # BLD AUTO: 358 K/UL (ref 150–450)
PMV BLD AUTO: 9.9 FL (ref 9.4–12.3)
POTASSIUM SERPL-SCNC: 3.9 MMOL/L (ref 3.5–5.1)
PROT SERPL-MCNC: 6.9 G/DL (ref 6.3–8.2)
RBC # BLD AUTO: 4.91 M/UL (ref 4.05–5.2)
SODIUM SERPL-SCNC: 140 MMOL/L (ref 136–145)
WBC # BLD AUTO: 6.4 K/UL (ref 4.3–11.1)

## 2019-05-24 PROCEDURE — 85025 COMPLETE CBC W/AUTO DIFF WBC: CPT

## 2019-05-24 PROCEDURE — 96360 HYDRATION IV INFUSION INIT: CPT | Performed by: EMERGENCY MEDICINE

## 2019-05-24 PROCEDURE — 76705 ECHO EXAM OF ABDOMEN: CPT

## 2019-05-24 PROCEDURE — 81003 URINALYSIS AUTO W/O SCOPE: CPT | Performed by: EMERGENCY MEDICINE

## 2019-05-24 PROCEDURE — 36415 COLL VENOUS BLD VENIPUNCTURE: CPT

## 2019-05-24 PROCEDURE — 65270000029 HC RM PRIVATE

## 2019-05-24 PROCEDURE — 74011250636 HC RX REV CODE- 250/636: Performed by: EMERGENCY MEDICINE

## 2019-05-24 PROCEDURE — 74011000258 HC RX REV CODE- 258: Performed by: INTERNAL MEDICINE

## 2019-05-24 PROCEDURE — 74181 MRI ABDOMEN W/O CONTRAST: CPT

## 2019-05-24 PROCEDURE — 83690 ASSAY OF LIPASE: CPT

## 2019-05-24 PROCEDURE — 99284 EMERGENCY DEPT VISIT MOD MDM: CPT | Performed by: EMERGENCY MEDICINE

## 2019-05-24 PROCEDURE — 0F798ZZ DILATION OF COMMON BILE DUCT, VIA NATURAL OR ARTIFICIAL OPENING ENDOSCOPIC: ICD-10-PCS | Performed by: INTERNAL MEDICINE

## 2019-05-24 PROCEDURE — 80053 COMPREHEN METABOLIC PANEL: CPT

## 2019-05-24 PROCEDURE — 77030020263 HC SOL INJ SOD CL0.9% LFCR 1000ML

## 2019-05-24 PROCEDURE — 74011250636 HC RX REV CODE- 250/636: Performed by: INTERNAL MEDICINE

## 2019-05-24 PROCEDURE — 87040 BLOOD CULTURE FOR BACTERIA: CPT

## 2019-05-24 RX ORDER — HEPARIN SODIUM 5000 [USP'U]/ML
5000 INJECTION, SOLUTION INTRAVENOUS; SUBCUTANEOUS EVERY 8 HOURS
Status: DISCONTINUED | OUTPATIENT
Start: 2019-05-24 | End: 2019-05-27 | Stop reason: HOSPADM

## 2019-05-24 RX ORDER — SODIUM CHLORIDE 0.9 % (FLUSH) 0.9 %
5-40 SYRINGE (ML) INJECTION EVERY 8 HOURS
Status: DISCONTINUED | OUTPATIENT
Start: 2019-05-24 | End: 2019-05-27 | Stop reason: HOSPADM

## 2019-05-24 RX ORDER — ONDANSETRON 2 MG/ML
4 INJECTION INTRAMUSCULAR; INTRAVENOUS
Status: DISCONTINUED | OUTPATIENT
Start: 2019-05-24 | End: 2019-05-27 | Stop reason: HOSPADM

## 2019-05-24 RX ORDER — SODIUM CHLORIDE 0.9 % (FLUSH) 0.9 %
5-40 SYRINGE (ML) INJECTION AS NEEDED
Status: DISCONTINUED | OUTPATIENT
Start: 2019-05-24 | End: 2019-05-27 | Stop reason: HOSPADM

## 2019-05-24 RX ORDER — HYDROCODONE BITARTRATE AND ACETAMINOPHEN 5; 325 MG/1; MG/1
1 TABLET ORAL
Status: DISCONTINUED | OUTPATIENT
Start: 2019-05-24 | End: 2019-05-27 | Stop reason: HOSPADM

## 2019-05-24 RX ORDER — SODIUM CHLORIDE 9 MG/ML
100 INJECTION, SOLUTION INTRAVENOUS CONTINUOUS
Status: DISCONTINUED | OUTPATIENT
Start: 2019-05-24 | End: 2019-05-27 | Stop reason: HOSPADM

## 2019-05-24 RX ORDER — MORPHINE SULFATE 2 MG/ML
1 INJECTION, SOLUTION INTRAMUSCULAR; INTRAVENOUS
Status: DISCONTINUED | OUTPATIENT
Start: 2019-05-24 | End: 2019-05-27 | Stop reason: HOSPADM

## 2019-05-24 RX ADMIN — Medication 10 ML: at 16:00

## 2019-05-24 RX ADMIN — PIPERACILLIN SODIUM,TAZOBACTAM SODIUM 3.38 G: 3; .375 INJECTION, POWDER, FOR SOLUTION INTRAVENOUS at 16:54

## 2019-05-24 RX ADMIN — SODIUM CHLORIDE 100 ML/HR: 900 INJECTION, SOLUTION INTRAVENOUS at 16:53

## 2019-05-24 RX ADMIN — ONDANSETRON 4 MG: 2 INJECTION INTRAMUSCULAR; INTRAVENOUS at 16:04

## 2019-05-24 RX ADMIN — HEPARIN SODIUM 5000 UNITS: 5000 INJECTION INTRAVENOUS; SUBCUTANEOUS at 16:54

## 2019-05-24 RX ADMIN — SODIUM CHLORIDE 1000 ML: 900 INJECTION, SOLUTION INTRAVENOUS at 10:06

## 2019-05-24 NOTE — ED PROVIDER NOTES
61-year-old white female has been diagnosed with cholelithiasis in February this year and has been referred to surgery for possible cholecystectomy. Her appointment is in 4 days. In February, patient underwent ERCP and sphincterotomy. She has continued to have episodes of pain. Most recent pain began last night. She reports multiple episodes of vomiting last night. No fever. She is currently on Zofran and Cipro. The history is provided by the patient. Abdominal Pain    Associated symptoms include nausea and vomiting. Pertinent negatives include no fever, no headaches, no chest pain and no back pain.         Past Medical History:   Diagnosis Date    Thyroid disease        Past Surgical History:   Procedure Laterality Date    HX BUNIONECTOMY      left foot    HX LUMBAR DISKECTOMY      HX TONSILLECTOMY           Family History:   Problem Relation Age of Onset    No Known Problems Mother     No Known Problems Father     No Known Problems Sister     No Known Problems Brother     No Known Problems Maternal Aunt     No Known Problems Maternal Uncle     No Known Problems Paternal Aunt     No Known Problems Paternal Uncle     No Known Problems Maternal Grandmother     No Known Problems Maternal Grandfather     No Known Problems Paternal Grandmother     No Known Problems Paternal Grandfather     No Known Problems Other        Social History     Socioeconomic History    Marital status: SINGLE     Spouse name: Not on file    Number of children: Not on file    Years of education: Not on file    Highest education level: Not on file   Occupational History    Not on file   Social Needs    Financial resource strain: Not on file    Food insecurity:     Worry: Not on file     Inability: Not on file    Transportation needs:     Medical: Not on file     Non-medical: Not on file   Tobacco Use    Smoking status: Former Smoker    Smokeless tobacco: Former User   Substance and Sexual Activity    Alcohol use: Yes    Drug use: No    Sexual activity: Never   Lifestyle    Physical activity:     Days per week: Not on file     Minutes per session: Not on file    Stress: Not on file   Relationships    Social connections:     Talks on phone: Not on file     Gets together: Not on file     Attends Latter day service: Not on file     Active member of club or organization: Not on file     Attends meetings of clubs or organizations: Not on file     Relationship status: Not on file    Intimate partner violence:     Fear of current or ex partner: Not on file     Emotionally abused: Not on file     Physically abused: Not on file     Forced sexual activity: Not on file   Other Topics Concern    Not on file   Social History Narrative    Not on file         ALLERGIES: Patient has no known allergies. Review of Systems   Constitutional: Negative for fever. HENT: Negative for congestion. Respiratory: Negative for cough and shortness of breath. Cardiovascular: Negative for chest pain. Gastrointestinal: Positive for abdominal pain, nausea and vomiting. Genitourinary: Negative for dyspareunia. Musculoskeletal: Negative for back pain and neck pain. Skin: Negative for rash. Neurological: Negative for headaches. Vitals:    05/24/19 0906   BP: 122/83   Pulse: 71   Resp: 16   Temp: 97.4 °F (36.3 °C)   SpO2: 95%   Weight: 99.8 kg (220 lb)   Height: 5' 5\" (1.651 m)            Physical Exam   Constitutional: She is oriented to person, place, and time. She appears well-developed and well-nourished. No distress. HENT:   Head: Normocephalic and atraumatic. Mouth/Throat: Oropharynx is clear and moist.   Eyes: Pupils are equal, round, and reactive to light. Conjunctivae are normal. No scleral icterus. Neck: Normal range of motion. Neck supple. Cardiovascular: Normal rate and regular rhythm. Pulmonary/Chest: Effort normal and breath sounds normal.   Abdominal: Soft.  Bowel sounds are normal. There is tenderness in the epigastric area. There is no rigidity, no rebound and no guarding. Musculoskeletal: Normal range of motion. She exhibits no edema. Neurological: She is alert and oriented to person, place, and time. Skin: Skin is warm and dry. Psychiatric: She has a normal mood and affect. Her behavior is normal.   Nursing note and vitals reviewed. MDM  Number of Diagnoses or Management Options  Calculus of gallbladder with acute cholecystitis without obstruction:   Diagnosis management comments: Lab work shows a normal white blood count and renal function however LFTs are now elevated with alkaline phosphatase 265, ,  and total bili 1.7. Ultrasound shows gallbladder with stones and sludge and a thickened wall approximately 6 mm and dilated common bile duct 10 mm. Discussed with gastroenterology who recommends admission by medicine for MRCP. General surgery also made aware of the cholecystitis.        Amount and/or Complexity of Data Reviewed  Clinical lab tests: ordered and reviewed  Tests in the radiology section of CPT®: ordered and reviewed  Tests in the medicine section of CPT®: ordered and reviewed  Discuss the patient with other providers: yes  Independent visualization of images, tracings, or specimens: yes    Risk of Complications, Morbidity, and/or Mortality  Presenting problems: moderate  Diagnostic procedures: moderate  Management options: moderate           Procedures

## 2019-05-24 NOTE — PROGRESS NOTES
TRANSFER - IN REPORT:    Verbal report received from Tara RN(name) on Susana Plants  being received from ED(unit) for routine progression of care      Report consisted of patients Situation, Background, Assessment and   Recommendations(SBAR). Information from the following report(s) SBAR, Kardex and ED Summary was reviewed with the receiving nurse. Opportunity for questions and clarification was provided. Assessment completed upon patients arrival to unit and care assumed.

## 2019-05-24 NOTE — CONSULTS
[facility] Consult Note       Consulting GI Physician: Dr. Yue Guerrero    Referring Provider:  Dr. Howe Leisure Date:  5/24/2019    Admit Date:  5/24/2019    Chief Complaint:  Hyperbilirubinemia, Cholelithiassis, CBD dilation. Subjective:     History of Present Illness:  Patient is a 66 y.o. female with PMH of Thyroid disease, who is seen in consultation at the request of Dr. Serge Murrell for Hyperbilirubinemia, Cholelithiassis, CBD dilation. She was admitted 5/24 with RUQ abdominal pain, N/V. RUQ US revealed distended gallbladder concern for cholecystitis with cholelithiasis, CBD 10mm. MRCP showed cholelithiasis and ongoing concern for acute cholecystitis. CBD was again dilated- measuring up to 12mm with mild intrahepatic biliary ductal dilatation and suspicion for 5mm stone impacted at the ampulla as well-choledocholithiasis. She was admitted 2/2019 for cholecystitis and then underwent ERCP 2/6/19 with sphincterotomy and stone extraction. (CBD, Dilated to 14 mm; 3 medium size yellow stones removed by balloon sweep). She was scheduled to see general surgery as outpt for cholecystectomy though had to postpone surgery. Since 2/2019 she had one \"flare\" of RUQ pain, N/V about 2wks ago and then again yesterday. She describes a RUQ \"kicking pain\" that was associated with nausea and several episodes of vomiting. Currently pain has eased some. She does c/o more of some epigastric discomfort that she feels is related to retching from her vomiting. She denies fever. No jaundice. She reports regular bowel patterns without GI bleeding. PMH:  Past Medical History:   Diagnosis Date    Thyroid disease        PSH:  Past Surgical History:   Procedure Laterality Date    HX BUNIONECTOMY      left foot    HX LUMBAR DISKECTOMY      HX TONSILLECTOMY         Allergies:  No Known Allergies    Home Medications:  Prior to Admission medications    Medication Sig Start Date End Date Taking?  Authorizing Provider   ondansetron hcl (ZOFRAN) 4 mg tablet Take 1 Tab by mouth every eight (8) hours as needed for Nausea. 2/8/19   Qiana Winter MD   traMADol [facility]) 50 mg tablet Take 1 Tab by mouth every six (6) hours as needed for Pain. Max Daily Amount: 200 mg. 2/8/19   Qiana Winter MD   levothyroxine (SYNTHROID) 137 mcg tablet Take 137 mcg by mouth Daily (before breakfast). Indications: hypothyroidism, New dose 10/15/18   Edilia Martinez DO   gabapentin (NEURONTIN) 300 mg capsule 1 in am, 1 in afternoon, 2 at bedtime.   Indications: NEUROPATHIC PAIN 5/21/18   Edilia Martinez DO       Hospital Medications:  Current Facility-Administered Medications   Medication Dose Route Frequency    sodium chloride (NS) flush 5-40 mL  5-40 mL IntraVENous Q8H    sodium chloride (NS) flush 5-40 mL  5-40 mL IntraVENous PRN    heparin (porcine) injection 5,000 Units  5,000 Units SubCUTAneous Q8H    ondansetron (ZOFRAN) injection 4 mg  4 mg IntraVENous Q4H PRN    HYDROcodone-acetaminophen (NORCO) 5-325 mg per tablet 1 Tab  1 Tab Oral Q4H PRN    morphine injection 1 mg  1 mg IntraVENous Q4H PRN    [START ON 5/25/2019] levothyroxine (SYNTHROID) tablet 137 mcg  137 mcg Oral ACB    0.9% sodium chloride infusion  100 mL/hr IntraVENous CONTINUOUS    piperacillin-tazobactam (ZOSYN) 3.375 g in 0.9% sodium chloride (MBP/ADV) 100 mL  3.375 g IntraVENous Q8H       Social History:  Social History     Tobacco Use    Smoking status: Former Smoker    Smokeless tobacco: Former User   Substance Use Topics    Alcohol use: Yes       Family History:  Family History   Problem Relation Age of Onset    No Known Problems Mother     No Known Problems Father     No Known Problems Sister     No Known Problems Brother     No Known Problems Maternal Aunt     No Known Problems Maternal Uncle     No Known Problems Paternal Aunt     No Known Problems Paternal Uncle     No Known Problems Maternal Grandmother     No Known Problems Maternal Grandfather     No Known Problems Paternal Grandmother     No Known Problems Paternal Grandfather     No Known Problems Other        Review of Systems:  A detailed 10 system ROS is obtained, with pertinent positives as listed above. All others are negative. Diet:  NPO    Objective:     Physical Exam:  Vitals:  Visit Vitals  /86   Pulse 71   Temp 98.8 °F (37.1 °C)   Resp 16   Ht 5' 5\" (1.651 m)   Wt 99.8 kg (220 lb)   SpO2 98%   BMI 36.61 kg/m²     Gen:  Pt is alert, cooperative, no acute distress  Skin:  Face reveal no rashes. HEENT: Sclerae anicteric. Cardiovascular: Regular rate and rhythm. Respiratory:  Comfortable breathing with no accessory muscle use. Clear breath sounds anteriorly with no wheezes, rales, or rhonchi. GI:  Abdomen nondistended, soft. Mild RUQ and epigastric ttp. Normal active bowel sounds. No enlargement of the liver or spleen. No masses palpable. Rectal:  Deferred  Musculoskeletal:  No pitting edema of the lower legs. Neurological:  Gross memory appears intact. Patient is alert and oriented. Psychiatric:  Mood appears appropriate with judgement intact. Lymphatic:  No cervical or supraclavicular adenopathy. Laboratory:    Recent Labs     05/24/19  0908   WBC 6.4   HGB 15.8*   HCT 47.4*      MCV 96.5      K 3.9      CO2 29   BUN 9   CREA 0.75   CA 9.0   *   *   SGOT 946*   *   TBILI 1.7*   ALB 3.6   TP 6.9   LPSE 164      RUQ US 5/24/19  Findings:  Ultrasound evaluation of the right upper quadrant is performed with representative gray-scale and color flow images provided. Gallbladder mildly  distended. There is clear gallbladder wall thickening. Multiple shadowing stones as well as sludge are seen in the dependent lumen. Sonography were does not report a clear sonographic Coughlin's sign. The Common Bile Duct (CBD) measures up to 10 mm. The visualized liver and pancreas show no abnormality.   There is no lesion or biliary dilatation. There is hepatopedal flow in the main portal vein. Visualized aorta and IVC unremarkable. Right kidney unremarkable. IMPRESSION  Gallbladder appears distended today with well evident cholelithiasis as well as sludge and clear wall thickening now. CBD dilated now. Gallbladder appearance is change from the prior study. Suspicious for acute cholecystitis. Correlate clinically. MRCP 5/24/19  FINDINGS:  Gallbladder again seen and well distended. Again there is wall thickening and slight pericholecystic edema with mild induration in the pericholecystic fat. There are numerous stones layering in the dependent lumen and probably some sludge. Liver normal in size, no discrete lesion. There is some mild diffuse biliary tree prominence. The CBD is dilated up to 11-12 mm at the ghulam hepatis. It does taper distally. However near the ampulla there appears to be a hypointense somewhat rounded filling defect-this appearance is suspicious for a choledochal stone at this site and measures 5 mm in diameter. Appearance of the pancreas on this unenhanced exam is unremarkable. Stomach and duodenal sweep nondistended. Visualized small bowel loops and colon nondistended with no obvious inflammation. Aorta and IVC patent. Grossly main portal vein appears patent as well. Adrenals and kidneys show no acute abnormality. No enlarged adenopathy seen. IMPRESSION   1. Again well evident is cholelithiasis, imaging findings continue to be suspicious for acute cholecystitis-as above. 2. CBD dilated measuring up to 12 mm at the ghulam hepatis. There is mild ntrahepatic biliary tree dilatation. I am suspicious on the MRCP sequence of a  5 mm stone impacted at the ampulla as well- choledocholithiasis.     ERCP 2/6/19  Findings:   Esophagus- Normal.  Stomach- Normal.  Duodenum- Normal.  Major duodenal papilla- Normal.  Common bile duct- Dilated to 14 mm; 3 medium size yellow stones removed by balloon sweep  Intrahepatic ducts- Markedly to the left near the spine  Pancreatic duct- Not entered. Gallbladder- Not visualized. Therapies: Sphincterotomy, stone extraction  Specimens: None  Estimated Blood Loss: 0 cc  Complications:   None; patient tolerated the procedure well. Impression: Three medium size yellow stones removed from bile duct. Generous sphincterotomy performed. Assessment:     Principal Problem:    Acute cholecystitis (5/24/2019)    Active Problems:    Acquired hypothyroidism (5/21/2018)      66 y.o. female with PMH of Thyroid disease, who is seen in consultation at the request of Dr. La Willams for Hyperbilirubinemia, Cholelithiassis, CBD dilation. She was admitted 5/24 with RUQ abdominal pain, N/V. Labs revealed Tbili 1.7, Alk phos 265, , . WBC WNL. RUQ US revealed distended gallbladder concern for cholecystitis with cholelithiasis, CBD 10mm. MRCP showed cholelithiasis and ongoing concern for acute cholecystitis. CBD was again dilated- measuring up to 12mm with mild intrahepatic biliary ductal dilatation and suspicion for 5mm stone impacted at the ampulla as well-choledocholithiasis. S/p admit 2/2019 for cholecystitis. ERCP then on 2/6/19 with sphincterotomy and stone extraction. Cholecystectomy planned though pt had to postpone surgery. Plan:     -Supportive care, IV antibiotics, anti-emetics p.r.n, analgesics  -NPO after midnight for ERCP tomorrow.  -Follow trend of LFTs  -Surgery consult attempted though pt was out of room for MRCP, follow surgery recs. Will benefit from cholecystectomy at some point in near future.     Jacqueline Braxton PA-C  [facility]

## 2019-05-24 NOTE — ED TRIAGE NOTES
Pt reports pain to her abd. States he has gall bladder. Pt states they are waiting to take her gallbladder out because she has been doing better. Started with nausea and vomiting last night. Denies diarrhea, normal BM this morning.

## 2019-05-24 NOTE — H&P (VIEW-ONLY)
[facility] Consult Note Consulting GI Physician: Dr. Geovanna Terrell Referring Provider:  Dr. Heidy Lind Consult Date:  5/24/2019 Admit Date:  5/24/2019 Chief Complaint:  Hyperbilirubinemia, Cholelithiassis, CBD dilation. Subjective:  
 
History of Present Illness:  Patient is a 66 y.o. female with PMH of Thyroid disease, who is seen in consultation at the request of Dr. Heidy Lind for Hyperbilirubinemia, Cholelithiassis, CBD dilation. She was admitted 5/24 with RUQ abdominal pain, N/V. RUQ US revealed distended gallbladder concern for cholecystitis with cholelithiasis, CBD 10mm. MRCP showed cholelithiasis and ongoing concern for acute cholecystitis. CBD was again dilated- measuring up to 12mm with mild intrahepatic biliary ductal dilatation and suspicion for 5mm stone impacted at the ampulla as well-choledocholithiasis. She was admitted 2/2019 for cholecystitis and then underwent ERCP 2/6/19 with sphincterotomy and stone extraction. (CBD, Dilated to 14 mm; 3 medium size yellow stones removed by balloon sweep). She was scheduled to see general surgery as outpt for cholecystectomy though had to postpone surgery. Since 2/2019 she had one \"flare\" of RUQ pain, N/V about 2wks ago and then again yesterday. She describes a RUQ \"kicking pain\" that was associated with nausea and several episodes of vomiting. Currently pain has eased some. She does c/o more of some epigastric discomfort that she feels is related to retching from her vomiting. She denies fever. No jaundice. She reports regular bowel patterns without GI bleeding. PMH: 
Past Medical History:  
Diagnosis Date  Thyroid disease PSH: 
Past Surgical History:  
Procedure Laterality Date  HX BUNIONECTOMY    
 left foot  HX LUMBAR DISKECTOMY  HX TONSILLECTOMY Allergies: 
No Known Allergies Home Medications: 
Prior to Admission medications Medication Sig Start Date End Date Taking? Authorizing Provider  
ondansetron hcl (ZOFRAN) 4 mg tablet Take 1 Tab by mouth every eight (8) hours as needed for Nausea. 2/8/19   Johnny Coyne MD  
traMADol [facility]) 50 mg tablet Take 1 Tab by mouth every six (6) hours as needed for Pain. Max Daily Amount: 200 mg. 2/8/19   Johnny Coyne MD  
levothyroxine (SYNTHROID) 137 mcg tablet Take 137 mcg by mouth Daily (before breakfast). Indications: hypothyroidism, New dose 10/15/18   Hemant Farrar DO  
gabapentin (NEURONTIN) 300 mg capsule 1 in am, 1 in afternoon, 2 at bedtime. Indications: NEUROPATHIC PAIN 5/21/18   Hemant Farrar DO Hospital Medications: 
Current Facility-Administered Medications Medication Dose Route Frequency  sodium chloride (NS) flush 5-40 mL  5-40 mL IntraVENous Q8H  
 sodium chloride (NS) flush 5-40 mL  5-40 mL IntraVENous PRN  
 heparin (porcine) injection 5,000 Units  5,000 Units SubCUTAneous Q8H  
 ondansetron (ZOFRAN) injection 4 mg  4 mg IntraVENous Q4H PRN  
 HYDROcodone-acetaminophen (NORCO) 5-325 mg per tablet 1 Tab  1 Tab Oral Q4H PRN  
 morphine injection 1 mg  1 mg IntraVENous Q4H PRN  
 [START ON 5/25/2019] levothyroxine (SYNTHROID) tablet 137 mcg  137 mcg Oral ACB  
 0.9% sodium chloride infusion  100 mL/hr IntraVENous CONTINUOUS  piperacillin-tazobactam (ZOSYN) 3.375 g in 0.9% sodium chloride (MBP/ADV) 100 mL  3.375 g IntraVENous Q8H Social History: 
Social History Tobacco Use  Smoking status: Former Smoker  Smokeless tobacco: Former User Substance Use Topics  Alcohol use: Yes Family History: 
Family History Problem Relation Age of Onset  No Known Problems Mother  No Known Problems Father  No Known Problems Sister  No Known Problems Brother  No Known Problems Maternal Aunt  No Known Problems Maternal Uncle  No Known Problems Paternal Aunt  No Known Problems Paternal Uncle  No Known Problems Maternal Grandmother  No Known Problems Maternal Grandfather  No Known Problems Paternal Grandmother  No Known Problems Paternal Grandfather  No Known Problems Other Review of Systems: A detailed 10 system ROS is obtained, with pertinent positives as listed above. All others are negative. Diet:  NPO Objective:  
 
Physical Exam: 
Vitals: 
Visit Vitals /86 Pulse 71 Temp 98.8 °F (37.1 °C) Resp 16 Ht 5' 5\" (1.651 m) Wt 99.8 kg (220 lb) SpO2 98% BMI 36.61 kg/m² Gen:  Pt is alert, cooperative, no acute distress Skin:  Face reveal no rashes. HEENT: Sclerae anicteric. Cardiovascular: Regular rate and rhythm. Respiratory:  Comfortable breathing with no accessory muscle use. Clear breath sounds anteriorly with no wheezes, rales, or rhonchi. GI:  Abdomen nondistended, soft. Mild RUQ and epigastric ttp. Normal active bowel sounds. No enlargement of the liver or spleen. No masses palpable. Rectal:  Deferred Musculoskeletal:  No pitting edema of the lower legs. Neurological:  Gross memory appears intact. Patient is alert and oriented. Psychiatric:  Mood appears appropriate with judgement intact. Lymphatic:  No cervical or supraclavicular adenopathy. Laboratory:   
Recent Labs  
  05/24/19 
0908 WBC 6.4 HGB 15.8* HCT 47.4*  
 MCV 96.5   
K 3.9  CO2 29 BUN 9  
CREA 0.75 CA 9.0  
* * SGOT 946* * TBILI 1.7* ALB 3.6 TP 6.9 LPSE 164 RUQ US 5/24/19 Findings:  Ultrasound evaluation of the right upper quadrant is performed with representative gray-scale and color flow images provided. Gallbladder mildly 
distended. There is clear gallbladder wall thickening. Multiple shadowing stones as well as sludge are seen in the dependent lumen. Sonography were does not report a clear sonographic Coughlin's sign.  The Common Bile Duct (CBD) measures up to 10 mm. The visualized liver and pancreas show no abnormality. There is no lesion or biliary dilatation. There is hepatopedal flow in the main portal vein. Visualized aorta and IVC unremarkable. Right kidney unremarkable. IMPRESSION Gallbladder appears distended today with well evident cholelithiasis as well as sludge and clear wall thickening now. CBD dilated now. Gallbladder appearance is change from the prior study. Suspicious for acute cholecystitis. Correlate clinically. MRCP 5/24/19 FINDINGS:  Gallbladder again seen and well distended. Again there is wall thickening and slight pericholecystic edema with mild induration in the pericholecystic fat. There are numerous stones layering in the dependent lumen and probably some sludge. Liver normal in size, no discrete lesion. There is some mild diffuse biliary tree prominence. The CBD is dilated up to 11-12 mm at the ghulam hepatis. It does taper distally. However near the ampulla there appears to be a hypointense somewhat rounded filling defect-this appearance is suspicious for a choledochal stone at this site and measures 5 mm in diameter. Appearance of the pancreas on this unenhanced exam is unremarkable. Stomach and duodenal sweep nondistended. Visualized small bowel loops and colon nondistended with no obvious inflammation. Aorta and IVC patent. Grossly main portal vein appears patent as well. Adrenals and kidneys show no acute abnormality. No enlarged adenopathy seen. IMPRESSION 1. Again well evident is cholelithiasis, imaging findings continue to be suspicious for acute cholecystitis-as above. 2. CBD dilated measuring up to 12 mm at the ghulam hepatis. There is mild ntrahepatic biliary tree dilatation. I am suspicious on the MRCP sequence of a 
5 mm stone impacted at the ampulla as well- choledocholithiasis. ERCP 2/6/19 Findings:  
Esophagus- Normal. 
Stomach- Normal. 
Duodenum- Normal. 
Major duodenal papilla- Normal. 
 Common bile duct- Dilated to 14 mm; 3 medium size yellow stones removed by balloon sweep Intrahepatic ducts- Markedly to the left near the spine Pancreatic duct- Not entered. Gallbladder- Not visualized. Therapies: Sphincterotomy, stone extraction Specimens: None Estimated Blood Loss: 0 cc Complications:   None; patient tolerated the procedure well. Impression: Three medium size yellow stones removed from bile duct. Generous sphincterotomy performed. Assessment:  
 
Principal Problem: 
  Acute cholecystitis (5/24/2019) Active Problems: 
  Acquired hypothyroidism (5/21/2018) 66 y.o. female with PMH of Thyroid disease, who is seen in consultation at the request of Dr. Heidy Lind for Hyperbilirubinemia, Cholelithiassis, CBD dilation. She was admitted 5/24 with RUQ abdominal pain, N/V. Labs revealed Tbili 1.7, Alk phos 265, , . WBC WNL. RUQ US revealed distended gallbladder concern for cholecystitis with cholelithiasis, CBD 10mm. MRCP showed cholelithiasis and ongoing concern for acute cholecystitis. CBD was again dilated- measuring up to 12mm with mild intrahepatic biliary ductal dilatation and suspicion for 5mm stone impacted at the ampulla as well-choledocholithiasis. S/p admit 2/2019 for cholecystitis. ERCP then on 2/6/19 with sphincterotomy and stone extraction. Cholecystectomy planned though pt had to postpone surgery. Plan:  
 
-Supportive care, IV antibiotics, anti-emetics p.r.n, analgesics 
-NPO after midnight for ERCP tomorrow. 
-Follow trend of LFTs 
-Surgery consult attempted though pt was out of room for MRCP, follow surgery recs. Will benefit from cholecystectomy at some point in near future. Mitch Payne PA-C [facility]

## 2019-05-24 NOTE — PROGRESS NOTES
05/24/19 1526   Dual Skin Pressure Injury Assessment   Dual Skin Pressure Injury Assessment WDL   Second Care Provider (Based on [address] [place] [address]) Jovan Parra RN

## 2019-05-24 NOTE — ED NOTES
TRANSFER - OUT REPORT:    Verbal report given to Pia Reardon RN on Kit Spikes  being transferred to 2nd floor for routine progression of care       Report consisted of patients Situation, Background, Assessment and   Recommendations(SBAR). Information from the following report(s) ED Summary was reviewed with the receiving nurse. Lines:   Peripheral IV 05/24/19 Left Antecubital (Active)   Site Assessment Clean, dry, & intact 5/24/2019  9:07 AM   Phlebitis Assessment 0 5/24/2019  9:07 AM   Infiltration Assessment 0 5/24/2019  9:07 AM   Dressing Status Clean, dry, & intact 5/24/2019  9:07 AM   Hub Color/Line Status Green 5/24/2019  9:07 AM        Opportunity for questions and clarification was provided.       Patient transported with:

## 2019-05-24 NOTE — H&P
Hospitalist H&P/Consult Note     Admit Date:  2019  9:06 AM   Name:  Max Vaz   Age:  66 y.o.  :  1960   MRN:  [id number]   PCP:  Jami Tapia DO  Treatment Team: Attending Provider: Sindi Aldana MD; Primary Nurse: Arleth Flores RN    HPI:   66years old F with PMH cholelithiasis presented to the hospital complaining of abdominal pain since last night. Patient reported pain is located on RUQ, non radiated, 5/10 intensity, constant. The pain was associated with nausea and vomiting several times clear fluids. Patient had an admission for cholecystitis on 2019. Patient had ERCP with stone removal and she was scheduled to follow up with general surgery outpatient for gall bladder removal. Patient stated she was busy at work and she had to postpone surgery. In the ED US with distended gall bladder and CBD dilation. 10 systems reviewed and negative except as noted in HPI. Past Medical History:   Diagnosis Date    Thyroid disease       Past Surgical History:   Procedure Laterality Date    HX BUNIONECTOMY      left foot    HX LUMBAR DISKECTOMY      HX TONSILLECTOMY        Prior to Admission Medications   Prescriptions Last Dose Informant Patient Reported? Taking?   gabapentin (NEURONTIN) 300 mg capsule   No No   Si in am, 1 in afternoon, 2 at bedtime. Indications: NEUROPATHIC PAIN   levothyroxine (SYNTHROID) 137 mcg tablet   No No   Sig: Take 137 mcg by mouth Daily (before breakfast). Indications: hypothyroidism, New dose   ondansetron hcl (ZOFRAN) 4 mg tablet   No No   Sig: Take 1 Tab by mouth every eight (8) hours as needed for Nausea. traMADol (ULTRAM) 50 mg tablet   No No   Sig: Take 1 Tab by mouth every six (6) hours as needed for Pain. Max Daily Amount: 200 mg.       Facility-Administered Medications: None     No Known Allergies   Social History     Tobacco Use    Smoking status: Former Smoker    Smokeless tobacco: Former User   Substance Use Topics    Alcohol use: Yes      Family History   Problem Relation Age of Onset    No Known Problems Mother     No Known Problems Father     No Known Problems Sister     No Known Problems Brother     No Known Problems Maternal Aunt     No Known Problems Maternal Uncle     No Known Problems Paternal Aunt     No Known Problems Paternal Uncle     No Known Problems Maternal Grandmother     No Known Problems Maternal Grandfather     No Known Problems Paternal Grandmother     No Known Problems Paternal Grandfather     No Known Problems Other         There is no immunization history on file for this patient. Objective:     Patient Vitals for the past 24 hrs:   Temp Pulse Resp BP SpO2   05/24/19 0906 97.4 °F (36.3 °C) 71 16 122/83 95 %     Oxygen Therapy  O2 Sat (%): 95 % (05/24/19 0906)  O2 Device: Room air (05/24/19 0906)  No intake or output data in the 24 hours ending 05/24/19 1311    Physical Exam:  General:    Well nourished. Alert. Eyes:   Normal sclera. Extraocular movements intact. ENT:  Normocephalic, atraumatic. Moist mucous membranes  CV:   RRR. No murmur, rub, or gallop. Lungs:  CTAB. No wheezing, rhonchi, or rales. Abdomen: RUQ tenderness. No rebound or guarding. Bowel sounds slightly diminished. Extremities: Warm and dry. No cyanosis or edema. Neurologic: CN II-XII grossly intact. No gross focal deficits. Skin:     No rashes or jaundice. No wounds. Psych:  Normal mood and affect. I reviewed the labs and other studies done this admission.   Data Review:   Recent Results (from the past 24 hour(s))   CBC WITH AUTOMATED DIFF    Collection Time: 05/24/19  9:08 AM   Result Value Ref Range    WBC 6.4 4.3 - 11.1 K/uL    RBC 4.91 4.05 - 5.2 M/uL    HGB 15.8 (H) 11.7 - 15.4 g/dL    HCT 47.4 (H) 35.8 - 46.3 %    MCV 96.5 79.6 - 97.8 FL    MCH 32.2 26.1 - 32.9 PG    MCHC 33.3 31.4 - 35.0 g/dL    RDW 13.2 11.9 - 14.6 %    PLATELET 598 469 - 997 K/uL    MPV 9.9 9.4 - 12.3 FL    ABSOLUTE NRBC 0.00 0.0 - 0.2 K/uL    DF AUTOMATED      NEUTROPHILS 70 43 - 78 %    LYMPHOCYTES 22 13 - 44 %    MONOCYTES 6 4.0 - 12.0 %    EOSINOPHILS 1 0.5 - 7.8 %    BASOPHILS 1 0.0 - 2.0 %    IMMATURE GRANULOCYTES 1 0.0 - 5.0 %    ABS. NEUTROPHILS 4.5 1.7 - 8.2 K/UL    ABS. LYMPHOCYTES 1.4 0.5 - 4.6 K/UL    ABS. MONOCYTES 0.4 0.1 - 1.3 K/UL    ABS. EOSINOPHILS 0.1 0.0 - 0.8 K/UL    ABS. BASOPHILS 0.0 0.0 - 0.2 K/UL    ABS. IMM. GRANS. 0.0 0.0 - 0.5 K/UL   METABOLIC PANEL, COMPREHENSIVE    Collection Time: 05/24/19  9:08 AM   Result Value Ref Range    Sodium 140 136 - 145 mmol/L    Potassium 3.9 3.5 - 5.1 mmol/L    Chloride 103 98 - 107 mmol/L    CO2 29 21 - 32 mmol/L    Anion gap 8 7 - 16 mmol/L    Glucose 106 (H) 65 - 100 mg/dL    BUN 9 6 - 23 MG/DL    Creatinine 0.75 0.6 - 1.0 MG/DL    GFR est AA >60 >60 ml/min/1.73m2    GFR est non-AA >60 >60 ml/min/1.73m2    Calcium 9.0 8.3 - 10.4 MG/DL    Bilirubin, total 1.7 (H) 0.2 - 1.1 MG/DL    ALT (SGPT) 590 (H) 12 - 65 U/L    AST (SGOT) 946 (H) 15 - 37 U/L    Alk.  phosphatase 265 (H) 50 - 136 U/L    Protein, total 6.9 6.3 - 8.2 g/dL    Albumin 3.6 3.5 - 5.0 g/dL    Globulin 3.3 2.3 - 3.5 g/dL    A-G Ratio 1.1 (L) 1.2 - 3.5     LIPASE    Collection Time: 05/24/19  9:08 AM   Result Value Ref Range    Lipase 164 73 - 393 U/L       Imaging Studies:  CXR Results  (Last 48 hours)    None        CT Results  (Last 48 hours)    None          Assessment and Plan:     Hospital Problems as of 5/24/2019 Date Reviewed: 2/12/2019          Codes Class Noted - Resolved POA    * (Principal) Acute cholecystitis ICD-10-CM: K81.0  ICD-9-CM: 575.0  5/24/2019 - Present Unknown        Acquired hypothyroidism ICD-10-CM: E03.9  ICD-9-CM: 244.9  5/21/2018 - Present Yes              A/P:    -Acute cholecystitis  US abdomen findings reviewed  Patient is afebrile with no WBC elevation  ED physician contacted GI who recommended MRCP      Plan  Start Zosyn and send blood cultures  Keep patient NPO  Pain management with morphine/norco. Zofran for N/V,  Hydration with NS @ 100  Get MRCP  General surgery and GI evaluation    -Hypothyroidism  Cont hormone replacement    DVT ppx: heparin  Code status:Full      Signed:   Dashawn Mcclelland MD

## 2019-05-25 ENCOUNTER — ANESTHESIA (OUTPATIENT)
Dept: ENDOSCOPY | Age: 59
DRG: 419 | End: 2019-05-25
Payer: COMMERCIAL

## 2019-05-25 ENCOUNTER — ANESTHESIA EVENT (OUTPATIENT)
Dept: SURGERY | Age: 59
DRG: 419 | End: 2019-05-25
Payer: COMMERCIAL

## 2019-05-25 ENCOUNTER — APPOINTMENT (OUTPATIENT)
Dept: GENERAL RADIOLOGY | Age: 59
DRG: 419 | End: 2019-05-25
Attending: INTERNAL MEDICINE
Payer: COMMERCIAL

## 2019-05-25 LAB
ALBUMIN SERPL-MCNC: 2.9 G/DL (ref 3.5–5)
ALBUMIN/GLOB SERPL: 1.1 {RATIO} (ref 1.2–3.5)
ALP SERPL-CCNC: 476 U/L (ref 50–136)
ALT SERPL-CCNC: 1338 U/L (ref 12–65)
ANION GAP SERPL CALC-SCNC: 7 MMOL/L (ref 7–16)
AST SERPL-CCNC: 1517 U/L (ref 15–37)
BASOPHILS # BLD: 0 K/UL (ref 0–0.2)
BASOPHILS NFR BLD: 1 % (ref 0–2)
BILIRUB SERPL-MCNC: 3 MG/DL (ref 0.2–1.1)
BUN SERPL-MCNC: 6 MG/DL (ref 6–23)
CALCIUM SERPL-MCNC: 8.3 MG/DL (ref 8.3–10.4)
CHLORIDE SERPL-SCNC: 109 MMOL/L (ref 98–107)
CO2 SERPL-SCNC: 27 MMOL/L (ref 21–32)
CREAT SERPL-MCNC: 0.7 MG/DL (ref 0.6–1)
DIFFERENTIAL METHOD BLD: NORMAL
EOSINOPHIL # BLD: 0.1 K/UL (ref 0–0.8)
EOSINOPHIL NFR BLD: 2 % (ref 0.5–7.8)
ERYTHROCYTE [DISTWIDTH] IN BLOOD BY AUTOMATED COUNT: 13.3 % (ref 11.9–14.6)
GLOBULIN SER CALC-MCNC: 2.7 G/DL (ref 2.3–3.5)
GLUCOSE SERPL-MCNC: 105 MG/DL (ref 65–100)
HCT VFR BLD AUTO: 41.2 % (ref 35.8–46.3)
HGB BLD-MCNC: 14 G/DL (ref 11.7–15.4)
IMM GRANULOCYTES # BLD AUTO: 0 K/UL (ref 0–0.5)
IMM GRANULOCYTES NFR BLD AUTO: 1 % (ref 0–5)
LYMPHOCYTES # BLD: 1.1 K/UL (ref 0.5–4.6)
LYMPHOCYTES NFR BLD: 16 % (ref 13–44)
MAGNESIUM SERPL-MCNC: 2 MG/DL (ref 1.8–2.4)
MCH RBC QN AUTO: 32.3 PG (ref 26.1–32.9)
MCHC RBC AUTO-ENTMCNC: 34 G/DL (ref 31.4–35)
MCV RBC AUTO: 95.2 FL (ref 79.6–97.8)
MONOCYTES # BLD: 0.6 K/UL (ref 0.1–1.3)
MONOCYTES NFR BLD: 8 % (ref 4–12)
NEUTS SEG # BLD: 5.2 K/UL (ref 1.7–8.2)
NEUTS SEG NFR BLD: 74 % (ref 43–78)
NRBC # BLD: 0 K/UL (ref 0–0.2)
PHOSPHATE SERPL-MCNC: 3.1 MG/DL (ref 2.5–4.5)
PLATELET # BLD AUTO: 272 K/UL (ref 150–450)
PMV BLD AUTO: 10.2 FL (ref 9.4–12.3)
POTASSIUM SERPL-SCNC: 3.3 MMOL/L (ref 3.5–5.1)
PROT SERPL-MCNC: 5.6 G/DL (ref 6.3–8.2)
RBC # BLD AUTO: 4.33 M/UL (ref 4.05–5.2)
SODIUM SERPL-SCNC: 143 MMOL/L (ref 136–145)
WBC # BLD AUTO: 7 K/UL (ref 4.3–11.1)

## 2019-05-25 PROCEDURE — 74011250637 HC RX REV CODE- 250/637: Performed by: FAMILY MEDICINE

## 2019-05-25 PROCEDURE — 80053 COMPREHEN METABOLIC PANEL: CPT

## 2019-05-25 PROCEDURE — 74011000250 HC RX REV CODE- 250

## 2019-05-25 PROCEDURE — 85025 COMPLETE CBC W/AUTO DIFF WBC: CPT

## 2019-05-25 PROCEDURE — 77030037088 HC TUBE ENDOTRACH ORAL NSL COVD-A: Performed by: ANESTHESIOLOGY

## 2019-05-25 PROCEDURE — 65270000029 HC RM PRIVATE

## 2019-05-25 PROCEDURE — 74011250637 HC RX REV CODE- 250/637: Performed by: INTERNAL MEDICINE

## 2019-05-25 PROCEDURE — 36415 COLL VENOUS BLD VENIPUNCTURE: CPT

## 2019-05-25 PROCEDURE — 84100 ASSAY OF PHOSPHORUS: CPT

## 2019-05-25 PROCEDURE — 77030020263 HC SOL INJ SOD CL0.9% LFCR 1000ML

## 2019-05-25 PROCEDURE — 77030032490 HC SLV COMPR SCD KNE COVD -B: Performed by: INTERNAL MEDICINE

## 2019-05-25 PROCEDURE — C1769 GUIDE WIRE: HCPCS | Performed by: INTERNAL MEDICINE

## 2019-05-25 PROCEDURE — 74011250636 HC RX REV CODE- 250/636

## 2019-05-25 PROCEDURE — 74330 X-RAY BILE/PANC ENDOSCOPY: CPT

## 2019-05-25 PROCEDURE — 74011250636 HC RX REV CODE- 250/636: Performed by: INTERNAL MEDICINE

## 2019-05-25 PROCEDURE — 74011636320 HC RX REV CODE- 636/320: Performed by: INTERNAL MEDICINE

## 2019-05-25 PROCEDURE — 76060000032 HC ANESTHESIA 0.5 TO 1 HR: Performed by: INTERNAL MEDICINE

## 2019-05-25 PROCEDURE — 83735 ASSAY OF MAGNESIUM: CPT

## 2019-05-25 PROCEDURE — 74011000258 HC RX REV CODE- 258: Performed by: INTERNAL MEDICINE

## 2019-05-25 PROCEDURE — 76040000026: Performed by: INTERNAL MEDICINE

## 2019-05-25 PROCEDURE — 74011250636 HC RX REV CODE- 250/636: Performed by: ANESTHESIOLOGY

## 2019-05-25 PROCEDURE — 77030039425 HC BLD LARYNG TRULITE DISP TELE -A: Performed by: ANESTHESIOLOGY

## 2019-05-25 PROCEDURE — 77030007288 HC DEV LOK BILI BSC -A: Performed by: INTERNAL MEDICINE

## 2019-05-25 PROCEDURE — 77030009038 HC CATH BILI STN RTVR BSC -C: Performed by: INTERNAL MEDICINE

## 2019-05-25 RX ORDER — SUCCINYLCHOLINE CHLORIDE 20 MG/ML
INJECTION INTRAMUSCULAR; INTRAVENOUS AS NEEDED
Status: DISCONTINUED | OUTPATIENT
Start: 2019-05-25 | End: 2019-05-25 | Stop reason: HOSPADM

## 2019-05-25 RX ORDER — POTASSIUM CHLORIDE 20 MEQ/1
40 TABLET, EXTENDED RELEASE ORAL
Status: COMPLETED | OUTPATIENT
Start: 2019-05-25 | End: 2019-05-25

## 2019-05-25 RX ORDER — SODIUM CHLORIDE, SODIUM LACTATE, POTASSIUM CHLORIDE, CALCIUM CHLORIDE 600; 310; 30; 20 MG/100ML; MG/100ML; MG/100ML; MG/100ML
25 INJECTION, SOLUTION INTRAVENOUS CONTINUOUS
Status: DISCONTINUED | OUTPATIENT
Start: 2019-05-25 | End: 2019-05-25 | Stop reason: HOSPADM

## 2019-05-25 RX ORDER — FENTANYL CITRATE 50 UG/ML
INJECTION, SOLUTION INTRAMUSCULAR; INTRAVENOUS AS NEEDED
Status: DISCONTINUED | OUTPATIENT
Start: 2019-05-25 | End: 2019-05-25 | Stop reason: HOSPADM

## 2019-05-25 RX ORDER — ROCURONIUM BROMIDE 10 MG/ML
INJECTION, SOLUTION INTRAVENOUS AS NEEDED
Status: DISCONTINUED | OUTPATIENT
Start: 2019-05-25 | End: 2019-05-25 | Stop reason: HOSPADM

## 2019-05-25 RX ORDER — OXYCODONE HYDROCHLORIDE 5 MG/1
5 TABLET ORAL
Status: DISCONTINUED | OUTPATIENT
Start: 2019-05-25 | End: 2019-05-25 | Stop reason: HOSPADM

## 2019-05-25 RX ORDER — NALOXONE HYDROCHLORIDE 0.4 MG/ML
0.2 INJECTION, SOLUTION INTRAMUSCULAR; INTRAVENOUS; SUBCUTANEOUS AS NEEDED
Status: DISCONTINUED | OUTPATIENT
Start: 2019-05-25 | End: 2019-05-25 | Stop reason: HOSPADM

## 2019-05-25 RX ORDER — ONDANSETRON 2 MG/ML
INJECTION INTRAMUSCULAR; INTRAVENOUS AS NEEDED
Status: DISCONTINUED | OUTPATIENT
Start: 2019-05-25 | End: 2019-05-25 | Stop reason: HOSPADM

## 2019-05-25 RX ORDER — LIDOCAINE HYDROCHLORIDE 10 MG/ML
0.1 INJECTION INFILTRATION; PERINEURAL AS NEEDED
Status: DISCONTINUED | OUTPATIENT
Start: 2019-05-25 | End: 2019-05-25 | Stop reason: HOSPADM

## 2019-05-25 RX ORDER — PROPOFOL 10 MG/ML
INJECTION, EMULSION INTRAVENOUS AS NEEDED
Status: DISCONTINUED | OUTPATIENT
Start: 2019-05-25 | End: 2019-05-25 | Stop reason: HOSPADM

## 2019-05-25 RX ORDER — HYDROMORPHONE HYDROCHLORIDE 2 MG/ML
0.5 INJECTION, SOLUTION INTRAMUSCULAR; INTRAVENOUS; SUBCUTANEOUS
Status: DISCONTINUED | OUTPATIENT
Start: 2019-05-25 | End: 2019-05-25 | Stop reason: HOSPADM

## 2019-05-25 RX ORDER — LIDOCAINE HYDROCHLORIDE 20 MG/ML
INJECTION, SOLUTION EPIDURAL; INFILTRATION; INTRACAUDAL; PERINEURAL AS NEEDED
Status: DISCONTINUED | OUTPATIENT
Start: 2019-05-25 | End: 2019-05-25 | Stop reason: HOSPADM

## 2019-05-25 RX ORDER — SODIUM CHLORIDE, SODIUM LACTATE, POTASSIUM CHLORIDE, CALCIUM CHLORIDE 600; 310; 30; 20 MG/100ML; MG/100ML; MG/100ML; MG/100ML
75 INJECTION, SOLUTION INTRAVENOUS CONTINUOUS
Status: DISCONTINUED | OUTPATIENT
Start: 2019-05-25 | End: 2019-05-25 | Stop reason: HOSPADM

## 2019-05-25 RX ADMIN — LIDOCAINE HYDROCHLORIDE 100 MG: 20 INJECTION, SOLUTION EPIDURAL; INFILTRATION; INTRACAUDAL; PERINEURAL at 08:47

## 2019-05-25 RX ADMIN — PROPOFOL 180 MG: 10 INJECTION, EMULSION INTRAVENOUS at 08:47

## 2019-05-25 RX ADMIN — PIPERACILLIN SODIUM,TAZOBACTAM SODIUM 3.38 G: 3; .375 INJECTION, POWDER, FOR SOLUTION INTRAVENOUS at 16:12

## 2019-05-25 RX ADMIN — IOPAMIDOL 6 ML: 755 INJECTION, SOLUTION INTRAVENOUS at 09:15

## 2019-05-25 RX ADMIN — SUCCINYLCHOLINE CHLORIDE 160 MG: 20 INJECTION INTRAMUSCULAR; INTRAVENOUS at 08:47

## 2019-05-25 RX ADMIN — FENTANYL CITRATE 50 MCG: 50 INJECTION, SOLUTION INTRAMUSCULAR; INTRAVENOUS at 09:11

## 2019-05-25 RX ADMIN — HEPARIN SODIUM 5000 UNITS: 5000 INJECTION INTRAVENOUS; SUBCUTANEOUS at 00:55

## 2019-05-25 RX ADMIN — ROCURONIUM BROMIDE 5 MG: 10 INJECTION, SOLUTION INTRAVENOUS at 08:47

## 2019-05-25 RX ADMIN — PIPERACILLIN SODIUM,TAZOBACTAM SODIUM 3.38 G: 3; .375 INJECTION, POWDER, FOR SOLUTION INTRAVENOUS at 00:56

## 2019-05-25 RX ADMIN — SODIUM CHLORIDE 100 ML/HR: 900 INJECTION, SOLUTION INTRAVENOUS at 12:51

## 2019-05-25 RX ADMIN — SODIUM CHLORIDE 100 ML/HR: 900 INJECTION, SOLUTION INTRAVENOUS at 22:32

## 2019-05-25 RX ADMIN — POTASSIUM CHLORIDE 40 MEQ: 20 TABLET, EXTENDED RELEASE ORAL at 22:30

## 2019-05-25 RX ADMIN — Medication 10 ML: at 13:13

## 2019-05-25 RX ADMIN — HEPARIN SODIUM 5000 UNITS: 5000 INJECTION INTRAVENOUS; SUBCUTANEOUS at 17:33

## 2019-05-25 RX ADMIN — SODIUM CHLORIDE, SODIUM LACTATE, POTASSIUM CHLORIDE, AND CALCIUM CHLORIDE: 600; 310; 30; 20 INJECTION, SOLUTION INTRAVENOUS at 08:44

## 2019-05-25 RX ADMIN — FENTANYL CITRATE 50 MCG: 50 INJECTION, SOLUTION INTRAMUSCULAR; INTRAVENOUS at 08:47

## 2019-05-25 RX ADMIN — LEVOTHYROXINE SODIUM 137 MCG: 50 TABLET ORAL at 06:26

## 2019-05-25 RX ADMIN — ONDANSETRON 4 MG: 2 INJECTION INTRAMUSCULAR; INTRAVENOUS at 09:07

## 2019-05-25 RX ADMIN — PIPERACILLIN SODIUM,TAZOBACTAM SODIUM 3.38 G: 3; .375 INJECTION, POWDER, FOR SOLUTION INTRAVENOUS at 10:34

## 2019-05-25 NOTE — PROGRESS NOTES
Hospitalist Progress Note    2019  Admit Date: 2019  9:06 AM   NAME: Leonarda Orozco   :  1960   MRN:  [id number]   Attending: Mariah Antunez MD  PCP:  Lazaro Infante DO    SUBJECTIVE:   Patient is a 80QG with hx recent admit for cholelithiasis presented with RUQ pain, again diagnosed with cholecystitis and cholelithiasis. Obstructing stone on MRCP. ERCP pending.     : ERCP this AM, stone removed, pain and nausea much improved. Mena Snow. Review of Systems negative with exception of pertinent positives noted above  PHYSICAL EXAM     Visit Vitals  /64   Pulse 68   Temp 98.7 °F (37.1 °C)   Resp 18   Ht 5' 5\" (1.651 m)   Wt 99.8 kg (220 lb)   SpO2 95%   BMI 36.61 kg/m²      Temp (24hrs), Av.6 °F (37 °C), Min:97.4 °F (36.3 °C), Max:99.3 °F (37.4 °C)    Oxygen Therapy  O2 Sat (%): 95 % (19 0712)  O2 Device: Room air (19 0906)    Intake/Output Summary (Last 24 hours) at 2019 0742  Last data filed at 2019 2708  Gross per 24 hour   Intake 354 ml   Output 2200 ml   Net -1846 ml      General: No acute distress    Lungs:  CTA Bilaterally. Heart:  Regular rate and rhythm,  No murmur, rub, or gallop  Abdomen: +BS, soft, RUQ tenderness, no rebound   Extremities: No cyanosis, clubbing or edema  Neurologic:  No focal deficits    MRI ABD WO CONT   Final Result   1. Again well evident is cholelithiasis, imaging findings continue to be   suspicious for acute cholecystitis-as above. 2. CBD dilated measuring up to 12 mm at the ghulam hepatis. There is mild   intrahepatic biliary tree dilatation. I am suspicious on the MRCP sequence of a   5 mm stone impacted at the ampulla as well- choledocholithiasis. US ABD LTD   Final Result   Gallbladder appears distended today with well evident cholelithiasis   as well as sludge and clear wall thickening now. CBD dilated now. Gallbladder   appearance is change from the prior study. Suspicious for acute cholecystitis.    Correlate clinically. ASSESSMENT      Active Hospital Problems    Diagnosis Date Noted    Acute cholecystitis 05/24/2019    Acquired hypothyroidism 05/21/2018     Plan:    -Acute cholecystitis, choledocolithiasis  MRCP with acute estefany and 5mm stone at ampulla  ERCP removed stone 5/25. GI and surgery following, possible cholecystectomy tomorrow.   Continue zosyn  Pain management with morphine/norco. Zofran for N/V,  Hydration with NS    -Hypothyroidism  Cont hormone replacement     DVT Prophylaxis: hsq  Dispo: pending ERCP    Signed By: Giulia Talavera MD     May 25, 2019

## 2019-05-25 NOTE — ANESTHESIA PREPROCEDURE EVALUATION
Anesthetic History   No history of anesthetic complications            Review of Systems / Medical History  Patient summary reviewed and pertinent labs reviewed    Pulmonary          Smoker (Former)         Neuro/Psych             Comments: Neuropathy on Gabapentin  Etoh abuse Cardiovascular  Within defined limits                Exercise tolerance: >4 METS  Comments: Denies CP, SOB or changes in functional status   GI/Hepatic/Renal           Liver disease    Comments: Elevated LFT's with cholelithiasis, CBD dilation Endo/Other      Hypothyroidism: well controlled  Obesity     Other Findings              Physical Exam    Airway  Mallampati: II  TM Distance: > 6 cm  Neck ROM: normal range of motion   Mouth opening: Normal     Cardiovascular    Rhythm: regular  Rate: normal         Dental  No notable dental hx       Pulmonary  Breath sounds clear to auscultation               Abdominal  GI exam deferred       Other Findings            Anesthetic Plan    ASA: 2  Anesthesia type: general          Induction: Intravenous  Anesthetic plan and risks discussed with: Patient

## 2019-05-25 NOTE — PROGRESS NOTES
TRANSFER - IN REPORT:    Verbal report received from Corina(name) on Kit Spikes  being received from 210(unit) for ordered procedure      Report consisted of patients Situation, Background, Assessment and   Recommendations(SBAR). Information from the following report(s) SBAR was reviewed with the receiving nurse. Opportunity for questions and clarification was provided. Assessment completed upon patients arrival to unit and care assumed.

## 2019-05-25 NOTE — ANESTHESIA POSTPROCEDURE EVALUATION
Procedure(s):  ENDOSCOPIC RETROGRADE CHOLANGIOPANCREATOGRAPHY (ERCP)  ENDOSCOPIC STONE EXTRACTION/BALLOON SWEEP. general    Anesthesia Post Evaluation      Multimodal analgesia: multimodal analgesia used between 6 hours prior to anesthesia start to PACU discharge  Patient location during evaluation: bedside  Patient participation: complete - patient participated  Level of consciousness: awake and alert  Pain score: 1  Pain management: adequate  Airway patency: patent  Anesthetic complications: no  Cardiovascular status: acceptable  Respiratory status: acceptable  Hydration status: acceptable  Comments: Patient doing well. Continue care on floor. Post anesthesia nausea and vomiting:  none      Vitals Value Taken Time   /82 5/25/2019  9:44 AM   Temp 36.5 °C (97.7 °F) 5/25/2019  9:31 AM   Pulse 62 5/25/2019  9:47 AM   Resp 16 5/25/2019  9:47 AM   SpO2 98 % 5/25/2019  9:47 AM   Vitals shown include unvalidated device data.

## 2019-05-25 NOTE — INTERVAL H&P NOTE
H&P Update: 
Dexter Arriola was seen and examined. History and physical has been reviewed. The patient has been examined. There have been no significant clinical changes since the completion of the originally dated History and Physical. 
 
Patient ready for her ERCP. Discussed risks including bleeding, perforation, IV complications, sedation risks, aspiration, cholangitis, and pancreatitis, and pt states understanding and agrees to proceed. Mer Phelps MD  
[facility]

## 2019-05-25 NOTE — PROGRESS NOTES
END OF SHIFT NOTE:    INTAKE/OUTPUT  05/24 0701 - 05/25 0700  In: -   Out: 2200 [Urine:2200]  Voiding: YES  Catheter: NO  Drain:              Flatus: Patient does have flatus present. Stool:  0 occurrences. Characteristics:       Emesis: 0 occurrences. Characteristics:        VITAL SIGNS  Patient Vitals for the past 12 hrs:   Temp Pulse Resp BP SpO2   05/25/19 0712 98.7 °F (37.1 °C) 68 18 105/64 95 %   05/25/19 0358 98.6 °F (37 °C) 75 18 132/79 98 %   05/24/19 2324 98.8 °F (37.1 °C) 73 16 135/75 97 %   05/24/19 1956 99.3 °F (37.4 °C) 66 16 135/78 98 %       Pain Assessment  Pain Intensity 1: 0 (05/25/19 0313)  Pain Location 1: Back  Pain Intervention(s) 1: Refused  Patient Stated Pain Goal: 2    Ambulating  Yes    Shift report given to oncoming nurse at the bedside.     Vamsi Zimmerman RN

## 2019-05-25 NOTE — CONSULTS
Consult Note/Progress Note:   Ary Mckeon  MRN: [id number]  [address] [facility]  Age:58 y.o. General Surgery consult ordered by: Dr. Tarah Murillo  Reason for General Surgery Consult: Eval for Cholecystectomy    As previously noted - HPI: Ary Mckeon is a 66 y.o. female with PMH of cholelithiasis who presented to the ED 5/24/19 complaining of abdominal pain since last night. Patient reported pain is located on RUQ, non radiated, 5/10 intensity, constant. The pain was associated with nausea and vomiting several times clear fluids. Patient had an admission for cholecystitis on 02/2019. Patient had ERCP with stone removal and she was scheduled to follow up with general surgery outpatient for gall bladder removal. Patient stated she was busy at work and she had to postpone surgery. Her evaluation in the hospital was suspicious for choledocholithiasis and MRCP was obtained. There was suspicion of common duct stones and she underwent ERCP on 5/25 were stones were removed and the duct was cleared. The cystic duct did not opacify on occlusion cholangiogram.  She still has evidence of acute cholecystitis. However her abdominal pain has markedly improved following ERCP. We discussed proceeding with cholecystectomy prior to hospital discharge.   Her LFTs have remained abnormal on every evaluation and we discussed proceeding with liver biopsy as well.     MRCP by GI - 5/24/19  ERCP by GI - 5/25/19    Past Medical History:   Diagnosis Date    Thyroid disease      Past Surgical History:   Procedure Laterality Date    HX BUNIONECTOMY      left foot    HX LUMBAR DISKECTOMY      HX TONSILLECTOMY       Current Facility-Administered Medications   Medication Dose Route Frequency    potassium chloride (K-DUR, KLOR-CON) SR tablet 40 mEq  40 mEq Oral QHS    sodium chloride (NS) flush 5-40 mL  5-40 mL IntraVENous Q8H    sodium chloride (NS) flush 5-40 mL  5-40 mL IntraVENous PRN    heparin (porcine) injection 5,000 Units  5,000 Units SubCUTAneous Q8H    ondansetron (ZOFRAN) injection 4 mg  4 mg IntraVENous Q4H PRN    HYDROcodone-acetaminophen (NORCO) 5-325 mg per tablet 1 Tab  1 Tab Oral Q4H PRN    morphine injection 1 mg  1 mg IntraVENous Q4H PRN    levothyroxine (SYNTHROID) tablet 137 mcg  137 mcg Oral ACB    0.9% sodium chloride infusion  100 mL/hr IntraVENous CONTINUOUS    piperacillin-tazobactam (ZOSYN) 3.375 g in 0.9% sodium chloride (MBP/ADV) 100 mL  3.375 g IntraVENous Q8H     Patient has no known allergies. Social History     Socioeconomic History    Marital status: SINGLE     Spouse name: Not on file    Number of children: Not on file    Years of education: Not on file    Highest education level: Not on file   Tobacco Use    Smoking status: Former Smoker    Smokeless tobacco: Former User   Substance and Sexual Activity    Alcohol use: Yes    Drug use: No    Sexual activity: Never     Social History     Tobacco Use   Smoking Status Former Smoker   Smokeless Tobacco Former User     Family History   Problem Relation Age of Onset    No Known Problems Mother     No Known Problems Father     No Known Problems Sister     No Known Problems Brother     No Known Problems Maternal Aunt     No Known Problems Maternal Uncle     No Known Problems Paternal Aunt     No Known Problems Paternal Uncle     No Known Problems Maternal Grandmother     No Known Problems Maternal Grandfather     No Known Problems Paternal Grandmother     No Known Problems Paternal Grandfather     No Known Problems Other      ROS: The patient has no difficulty with chest pain or shortness of breath. No fever or chills. Comprehensive review of systems was otherwise unremarkable except as noted above.     Physical Exam:   Visit Vitals  /76   Pulse 66   Temp 98.2 °F (36.8 °C)   Resp 18   Ht 5' 5\" (1.651 m)   Wt 220 lb (99.8 kg)   SpO2 97%   BMI 36.61 kg/m²     Vitals:    05/25/19 0950 05/25/19 0955 05/25/19 1000 05/25/19 1134   BP: 127/76 128/79 130/81 112/76   Pulse: 60 60 (!) 59 66   Resp: 16 17 19 18   Temp:  98.1 °F (36.7 °C)  98.2 °F (36.8 °C)   SpO2: 98% 98% 98% 97%   Weight:       Height:         05/25 0701 - 05/25 1900  In: 7503 [P.O.:120; I.V.:954]  Out: 900 [Urine:900]  05/23 1901 - 05/25 0700  In: -   Out: 2200 [Urine:2200]    Constitutional: Alert, cooperative, no acute distress; appears stated age    Eyes:Sclera are clear. EOMs intact  ENMT: no external lesions gross hearing normal; no obvious neck masses, no ear or lip lesions, nares normal  CV: RRR. Normal perfusion  Resp: No JVD. Breathing is  non-labored; no audible wheezing. GI: soft and non-distended, minimal RUQ and epigastric tenderness   Musculoskeletal: unremarkable with normal function. No embolic signs or cyanosis.    Neuro:  Oriented; moves all 4; no focal deficits  Psychiatric: normal affect and mood, no memory impairment    Recent vitals (if inpt):  Patient Vitals for the past 24 hrs:   BP Temp Pulse Resp SpO2   05/25/19 1134 112/76 98.2 °F (36.8 °C) 66 18 97 %   05/25/19 1000 130/81  (!) 59 19 98 %   05/25/19 0955 128/79 98.1 °F (36.7 °C) 60 17 98 %   05/25/19 0950 127/76  60 16 98 %   05/25/19 0944 133/82  61 16 97 %   05/25/19 0939 122/79  62 11 98 %   05/25/19 0934 121/74  63 11 98 %   05/25/19 0931 121/75 97.7 °F (36.5 °C) 64 14 100 %   05/25/19 0929 121/75  65 19 98 %   05/25/19 0712 105/64 98.7 °F (37.1 °C) 68 18 95 %   05/25/19 0358 132/79 98.6 °F (37 °C) 75 18 98 %   05/24/19 2324 135/75 98.8 °F (37.1 °C) 73 16 97 %   05/24/19 1956 135/78 99.3 °F (37.4 °C) 66 16 98 %       Labs:  Recent Labs     05/25/19  0605 05/24/19  0908   WBC 7.0 6.4   HGB 14.0 15.8*    358    140   K 3.3* 3.9   * 103   CO2 27 29   BUN 6 9   CREA 0.70 0.75   * 106*   TBILI 3.0* 1.7*   SGOT 1,517* 946*   ALT 1,338* 590*   * 265*   LPSE  --  164       Lab Results   Component Value Date/Time    WBC 7.0 05/25/2019 06:05 AM    HGB 14.0 05/25/2019 06:05 AM PLATELET 843 71/42/5395 06:05 AM    Sodium 143 05/25/2019 06:05 AM    Potassium 3.3 (L) 05/25/2019 06:05 AM    Chloride 109 (H) 05/25/2019 06:05 AM    CO2 27 05/25/2019 06:05 AM    BUN 6 05/25/2019 06:05 AM    Creatinine 0.70 05/25/2019 06:05 AM    Glucose 105 (H) 05/25/2019 06:05 AM    Bilirubin, total 3.0 (H) 05/25/2019 06:05 AM    Bilirubin, direct 1.6 (H) 02/07/2019 03:59 AM    AST (SGOT) 1,517 (H) 05/25/2019 06:05 AM    ALT (SGPT) 1,338 (H) 05/25/2019 06:05 AM    Alk. phosphatase 476 (H) 05/25/2019 06:05 AM    Lipase 164 05/24/2019 09:08 AM       CT Results  (Last 48 hours)    None        chest X-ray      I reviewed recent labs, recent radiologic studies, and pertinent records including other doctor notes if needed. I independently reviewed radiology images for studies I described above or studies I have ordered. Admission date (for inpatients): 5/24/2019   Day of Surgery  Procedure(s):  ENDOSCOPIC RETROGRADE CHOLANGIOPANCREATOGRAPHY (ERCP)  ENDOSCOPIC STONE EXTRACTION/BALLOON SWEEP    ASSESSMENT/PLAN:  Problem List  Date Reviewed: 5/25/2019          Codes Class Noted    * (Principal) Acute cholecystitis ICD-10-CM: K81.0  ICD-9-CM: 575.0  5/24/2019        Choledocholithiasis with acute cholecystitis ICD-10-CM: K80.42  ICD-9-CM: 574.30  2/5/2019        Abdominal pain ICD-10-CM: R10.9  ICD-9-CM: 789.00  2/5/2019        Severe obesity with body mass index (BMI) of 35.0 to 39.9 with serious comorbidity ([facility] [address] ICD-10-CM: E66.01  ICD-9-CM: 278.01  7/3/2018        Acquired hypothyroidism ICD-10-CM: E03.9  ICD-9-CM: 244.9  5/21/2018        Chronic cervical pain ICD-10-CM: M54.2, G89.29  ICD-9-CM: 723.1, 338.29  5/21/2018    Overview Addendum 8/10/2018  5:34 PM by Vi Lemos DO     Pt is also on gabapentin. Prescribed by her PCP due to Musculoskeletal issues. Pt says she was told her vertebrae are crushed together and another vertebrae has a bone spur, impinging on nerve and making her right arm go numb.  Pt reports works well. Denies adr.  MRI scanned, 1/29/2015  C2-3, moderate right facet hyeprtrophy and mild narrowing of the right neural foramen, and 2mm anterolisthesis of C2 on C3.  1-2mm posterior disc protrusion at T1-2, partiall visualized. Mild DDD and spondylosis from C3-4 throug C6-7, but w/o central spinal stenosis or neural formainla narrowing. Principal Problem:    Acute cholecystitis (5/24/2019)    Active Problems:    Acquired hypothyroidism (5/21/2018)       Plan:  Care management per Hospitalist  GI Following  CLD today  NPO after midnight  To OR at 8am tomorrow  Verify Consent - Lap, possible open, Cholecystectomy and liver biopsy    Evelin Pereira NP    I discussed proceeding with laparoscopic possible open cholecystectomy with liver biopsy following duct clearance. She understands that the chance of recurrence in the next 6 weeks without cholecystectomy is high. There is also possibility of acute cholecystitis though her abdominal symptoms have markedly improved. I discussed the patient's condition and treatment options with the patient. I discussed risks of surgery in language the patient could understand including bleeding, infection, bile leak, bile duct injury, pancreatitis, retained stones, chronic pain, chronic diarrhea, need for further surgery, abscess, fistula, SBO, DVT, PE, heart attack, stroke, renal failure, respiratory failure, ventilatory dependence, and death. The patient voiced understanding of all this and all questions were answered. Alternatives to surgery were discussed also and risks of the alternatives. The patient requested that we proceed with surgery. Informed consent was obtained. I have personally performed a face-to-face diagnostic evaluation and management  service on this patient. I have independently seen the patient. I have independently obtained the above history from the patient/family.     I have independently examined the patient with above findings. I have independently reviewed data/labs for this patient and developed the above plan of care (MDM).   Signed: Jonas Gayle MD, FACS

## 2019-05-25 NOTE — PROGRESS NOTES
TRANSFER - IN REPORT:    Verbal report received from Nahun Rivera RN(name) on Karen Rendon  being received from PACU(unit) for routine progression of care      Report consisted of patients Situation, Background, Assessment and   Recommendations(SBAR). Information from the following report(s) SBAR, Kardex and Procedure Summary was reviewed with the receiving nurse. Opportunity for questions and clarification was provided. Assessment completed upon patients arrival to unit and care assumed.

## 2019-05-25 NOTE — PERIOP NOTES
TRANSFER - OUT REPORT:    Verbal report given to [address] on Jose Corral  being transferred to [facility] for routine post - op       Report consisted of patients Situation, Background, Assessment and   Recommendations(SBAR). Information from the following report(s) SBAR, OR Summary, Procedure Summary, Intake/Output and MAR was reviewed with the receiving nurse. Lines:   Peripheral IV 05/24/19 Left Antecubital (Active)   Site Assessment Clean, dry, & intact 5/25/2019  9:31 AM   Phlebitis Assessment 0 5/25/2019  9:31 AM   Infiltration Assessment 0 5/25/2019  9:31 AM   Dressing Status Clean, dry, & intact 5/25/2019  9:31 AM   Dressing Type Transparent;Tape 5/25/2019  9:31 AM   Hub Color/Line Status Infusing 5/25/2019  9:31 AM   Alcohol Cap Used No 5/25/2019  9:31 AM        Opportunity for questions and clarification was provided. Patient transported with:   O2 @ 2 liters    VTE prophylaxis orders have been written for Jose Corral. Patient and family given floor number and nurses name. Family updated re: pt status after security code verified.

## 2019-05-25 NOTE — PROCEDURES
ERCP: Endoscopic Retrograde Cholangiopancreatogram    DATE of PROCEDURE: 5/25/2019    INDICATION:  1. Choledocholithiasis    POSTPROCEDURE DIAGNOSIS:  1. Choledocholithiasis  2. Dilated CBD    MEDICATIONS ADMINISTERED:    1. General anesthesia    INSTRUMENT:    PROCEDURE:  After obtaining informed consent, the patient was placed in prone position on the fluoroscopy table. The patient was then sedated. The endoscope was passed under indirect technique to the oropharynx and gently passed into the esophagus. The endoscope was passed to the ampulla. Only partial views of the stomach and duodenum were obtained. After the procedure, the patient was taken to the recovery area in stable condition. FINDINGS:  AMPULLA: appeared normal with prior sphincterotomy  BILE DUCT: cannulated with 9 - 12 mm balloon. Initial cholangiogram revealed a single filling defect in the distal CBD along with upstream dilation up to 12 mm. Using the stone extraction balloon, multiple sweeps were performed with removal of the stone. Following the extraction, an occlusion cholangiogram was performed which showed no further filling defects. PANCREATIC DUCT: not cannulated    ASSESSMENT/PLAN:  1. Choledocholithiasis. One stone removed from the CBD. 2. Dilated CBD    - Surgery to see patient to consider CCY. - Monitor LFTs  - Return to floor once stable.       Anjel Caputo MD  [facility], [place]

## 2019-05-26 ENCOUNTER — ANESTHESIA (OUTPATIENT)
Dept: SURGERY | Age: 59
DRG: 419 | End: 2019-05-26
Payer: COMMERCIAL

## 2019-05-26 LAB
ALBUMIN SERPL-MCNC: 3.1 G/DL (ref 3.5–5)
ALBUMIN/GLOB SERPL: 1 {RATIO} (ref 1.2–3.5)
ALP SERPL-CCNC: 598 U/L (ref 50–136)
ALT SERPL-CCNC: 1273 U/L (ref 12–65)
ANION GAP SERPL CALC-SCNC: 8 MMOL/L (ref 7–16)
AST SERPL-CCNC: 900 U/L (ref 15–37)
BILIRUB SERPL-MCNC: 1.5 MG/DL (ref 0.2–1.1)
BUN SERPL-MCNC: 5 MG/DL (ref 6–23)
CALCIUM SERPL-MCNC: 8.6 MG/DL (ref 8.3–10.4)
CHLORIDE SERPL-SCNC: 109 MMOL/L (ref 98–107)
CO2 SERPL-SCNC: 27 MMOL/L (ref 21–32)
CREAT SERPL-MCNC: 0.84 MG/DL (ref 0.6–1)
ERYTHROCYTE [DISTWIDTH] IN BLOOD BY AUTOMATED COUNT: 13.7 % (ref 11.9–14.6)
GLOBULIN SER CALC-MCNC: 3.2 G/DL (ref 2.3–3.5)
GLUCOSE SERPL-MCNC: 83 MG/DL (ref 65–100)
HCT VFR BLD AUTO: 45.1 % (ref 35.8–46.3)
HGB BLD-MCNC: 14.6 G/DL (ref 11.7–15.4)
INR PPP: 1
MCH RBC QN AUTO: 31.9 PG (ref 26.1–32.9)
MCHC RBC AUTO-ENTMCNC: 32.4 G/DL (ref 31.4–35)
MCV RBC AUTO: 98.7 FL (ref 79.6–97.8)
NRBC # BLD: 0 K/UL (ref 0–0.2)
PLATELET # BLD AUTO: 272 K/UL (ref 150–450)
PMV BLD AUTO: 10.3 FL (ref 9.4–12.3)
POTASSIUM SERPL-SCNC: 3.5 MMOL/L (ref 3.5–5.1)
PROT SERPL-MCNC: 6.3 G/DL (ref 6.3–8.2)
PROTHROMBIN TIME: 13.1 SEC (ref 11.7–14.5)
RBC # BLD AUTO: 4.57 M/UL (ref 4.05–5.2)
SODIUM SERPL-SCNC: 144 MMOL/L (ref 136–145)
WBC # BLD AUTO: 5.9 K/UL (ref 4.3–11.1)

## 2019-05-26 PROCEDURE — 88312 SPECIAL STAINS GROUP 1: CPT

## 2019-05-26 PROCEDURE — 76210000006 HC OR PH I REC 0.5 TO 1 HR: Performed by: SURGERY

## 2019-05-26 PROCEDURE — 88313 SPECIAL STAINS GROUP 2: CPT

## 2019-05-26 PROCEDURE — 77030008522 HC TBNG INSUF LAPRO STRY -B: Performed by: SURGERY

## 2019-05-26 PROCEDURE — 74011250636 HC RX REV CODE- 250/636

## 2019-05-26 PROCEDURE — 77030035048 HC TRCR ENDOSC OPTCL COVD -B: Performed by: SURGERY

## 2019-05-26 PROCEDURE — 77030034849: Performed by: SURGERY

## 2019-05-26 PROCEDURE — 74011000250 HC RX REV CODE- 250: Performed by: SURGERY

## 2019-05-26 PROCEDURE — 77030000038 HC TIP SCIS LAPSCP SURI -B: Performed by: SURGERY

## 2019-05-26 PROCEDURE — 77030008771 HC TU NG SALEM SUMP -A: Performed by: ANESTHESIOLOGY

## 2019-05-26 PROCEDURE — 77030031139 HC SUT VCRL2 J&J -A: Performed by: SURGERY

## 2019-05-26 PROCEDURE — 80053 COMPREHEN METABOLIC PANEL: CPT

## 2019-05-26 PROCEDURE — 36415 COLL VENOUS BLD VENIPUNCTURE: CPT

## 2019-05-26 PROCEDURE — 76010000173 HC OR TIME 3 TO 3.5 HR INTENSV-TIER 1: Performed by: SURGERY

## 2019-05-26 PROCEDURE — 74011250636 HC RX REV CODE- 250/636: Performed by: SURGERY

## 2019-05-26 PROCEDURE — 65270000029 HC RM PRIVATE

## 2019-05-26 PROCEDURE — 74011250636 HC RX REV CODE- 250/636: Performed by: ANESTHESIOLOGY

## 2019-05-26 PROCEDURE — 74011250636 HC RX REV CODE- 250/636: Performed by: INTERNAL MEDICINE

## 2019-05-26 PROCEDURE — 77030008756 HC TU IRR SUC STRY -B: Performed by: SURGERY

## 2019-05-26 PROCEDURE — 77010033678 HC OXYGEN DAILY

## 2019-05-26 PROCEDURE — 74011000250 HC RX REV CODE- 250

## 2019-05-26 PROCEDURE — 77030035044 HC TRCR ENDOSC VRSPRT BLDLSS COVD -C: Performed by: SURGERY

## 2019-05-26 PROCEDURE — 77030035051: Performed by: SURGERY

## 2019-05-26 PROCEDURE — 85610 PROTHROMBIN TIME: CPT

## 2019-05-26 PROCEDURE — 77030018876 HC APPL CLP LIG4 COVD -B: Performed by: SURGERY

## 2019-05-26 PROCEDURE — 77030002996 HC SUT SLK J&J -A: Performed by: SURGERY

## 2019-05-26 PROCEDURE — 77030013567 HC DRN WND RESERV BARD -A: Performed by: SURGERY

## 2019-05-26 PROCEDURE — 0FB04ZX EXCISION OF LIVER, PERCUTANEOUS ENDOSCOPIC APPROACH, DIAGNOSTIC: ICD-10-PCS | Performed by: SURGERY

## 2019-05-26 PROCEDURE — 88305 TISSUE EXAM BY PATHOLOGIST: CPT

## 2019-05-26 PROCEDURE — 74011000250 HC RX REV CODE- 250: Performed by: ANESTHESIOLOGY

## 2019-05-26 PROCEDURE — 76060000037 HC ANESTHESIA 3 TO 3.5 HR: Performed by: SURGERY

## 2019-05-26 PROCEDURE — 77030037088 HC TUBE ENDOTRACH ORAL NSL COVD-A: Performed by: ANESTHESIOLOGY

## 2019-05-26 PROCEDURE — 77030032490 HC SLV COMPR SCD KNE COVD -B: Performed by: SURGERY

## 2019-05-26 PROCEDURE — 74011000258 HC RX REV CODE- 258: Performed by: INTERNAL MEDICINE

## 2019-05-26 PROCEDURE — 77030019940 HC BLNKT HYPOTHRM STRY -B: Performed by: ANESTHESIOLOGY

## 2019-05-26 PROCEDURE — 94760 N-INVAS EAR/PLS OXIMETRY 1: CPT

## 2019-05-26 PROCEDURE — 74011250637 HC RX REV CODE- 250/637: Performed by: INTERNAL MEDICINE

## 2019-05-26 PROCEDURE — 85027 COMPLETE CBC AUTOMATED: CPT

## 2019-05-26 PROCEDURE — 0FT44ZZ RESECTION OF GALLBLADDER, PERCUTANEOUS ENDOSCOPIC APPROACH: ICD-10-PCS | Performed by: SURGERY

## 2019-05-26 PROCEDURE — 88304 TISSUE EXAM BY PATHOLOGIST: CPT

## 2019-05-26 PROCEDURE — 77030002967 HC SUT PDS J&J -B: Performed by: SURGERY

## 2019-05-26 PROCEDURE — 77030034850: Performed by: SURGERY

## 2019-05-26 PROCEDURE — 77030019908 HC STETH ESOPH SIMS -A: Performed by: ANESTHESIOLOGY

## 2019-05-26 PROCEDURE — 77030020263 HC SOL INJ SOD CL0.9% LFCR 1000ML

## 2019-05-26 PROCEDURE — 77030039425 HC BLD LARYNG TRULITE DISP TELE -A: Performed by: ANESTHESIOLOGY

## 2019-05-26 PROCEDURE — 77030027502 HC TRCR ENDOSC BLNT COVD -B: Performed by: SURGERY

## 2019-05-26 PROCEDURE — 77030025088 HC APPL CLP LIG 1 COVD -B: Performed by: SURGERY

## 2019-05-26 PROCEDURE — 77030037892: Performed by: SURGERY

## 2019-05-26 RX ORDER — LIDOCAINE HYDROCHLORIDE 20 MG/ML
INJECTION, SOLUTION EPIDURAL; INFILTRATION; INTRACAUDAL; PERINEURAL AS NEEDED
Status: DISCONTINUED | OUTPATIENT
Start: 2019-05-26 | End: 2019-05-26 | Stop reason: HOSPADM

## 2019-05-26 RX ORDER — SODIUM CHLORIDE, SODIUM LACTATE, POTASSIUM CHLORIDE, CALCIUM CHLORIDE 600; 310; 30; 20 MG/100ML; MG/100ML; MG/100ML; MG/100ML
50 INJECTION, SOLUTION INTRAVENOUS CONTINUOUS
Status: DISCONTINUED | OUTPATIENT
Start: 2019-05-26 | End: 2019-05-26 | Stop reason: HOSPADM

## 2019-05-26 RX ORDER — GLYCOPYRROLATE 0.2 MG/ML
INJECTION INTRAMUSCULAR; INTRAVENOUS AS NEEDED
Status: DISCONTINUED | OUTPATIENT
Start: 2019-05-26 | End: 2019-05-26 | Stop reason: HOSPADM

## 2019-05-26 RX ORDER — SODIUM CHLORIDE, SODIUM LACTATE, POTASSIUM CHLORIDE, CALCIUM CHLORIDE 600; 310; 30; 20 MG/100ML; MG/100ML; MG/100ML; MG/100ML
25 INJECTION, SOLUTION INTRAVENOUS CONTINUOUS
Status: DISCONTINUED | OUTPATIENT
Start: 2019-05-26 | End: 2019-05-26 | Stop reason: HOSPADM

## 2019-05-26 RX ORDER — DEXAMETHASONE SODIUM PHOSPHATE 4 MG/ML
INJECTION, SOLUTION INTRA-ARTICULAR; INTRALESIONAL; INTRAMUSCULAR; INTRAVENOUS; SOFT TISSUE AS NEEDED
Status: DISCONTINUED | OUTPATIENT
Start: 2019-05-26 | End: 2019-05-26 | Stop reason: HOSPADM

## 2019-05-26 RX ORDER — BUPIVACAINE HYDROCHLORIDE AND EPINEPHRINE 2.5; 5 MG/ML; UG/ML
INJECTION, SOLUTION EPIDURAL; INFILTRATION; INTRACAUDAL; PERINEURAL AS NEEDED
Status: DISCONTINUED | OUTPATIENT
Start: 2019-05-26 | End: 2019-05-26 | Stop reason: HOSPADM

## 2019-05-26 RX ORDER — HYDROCODONE BITARTRATE AND ACETAMINOPHEN 5; 325 MG/1; MG/1
1-2 TABLET ORAL
Status: CANCELLED | OUTPATIENT
Start: 2019-05-26

## 2019-05-26 RX ORDER — OXYCODONE HYDROCHLORIDE 5 MG/1
5 TABLET ORAL
Status: DISCONTINUED | OUTPATIENT
Start: 2019-05-26 | End: 2019-05-26 | Stop reason: HOSPADM

## 2019-05-26 RX ORDER — ONDANSETRON 2 MG/ML
INJECTION INTRAMUSCULAR; INTRAVENOUS AS NEEDED
Status: DISCONTINUED | OUTPATIENT
Start: 2019-05-26 | End: 2019-05-26 | Stop reason: HOSPADM

## 2019-05-26 RX ORDER — FENTANYL CITRATE 50 UG/ML
INJECTION, SOLUTION INTRAMUSCULAR; INTRAVENOUS AS NEEDED
Status: DISCONTINUED | OUTPATIENT
Start: 2019-05-26 | End: 2019-05-26 | Stop reason: HOSPADM

## 2019-05-26 RX ORDER — ONDANSETRON 2 MG/ML
4 INJECTION INTRAMUSCULAR; INTRAVENOUS
Status: DISCONTINUED | OUTPATIENT
Start: 2019-05-26 | End: 2019-05-26 | Stop reason: HOSPADM

## 2019-05-26 RX ORDER — PROPOFOL 10 MG/ML
INJECTION, EMULSION INTRAVENOUS AS NEEDED
Status: DISCONTINUED | OUTPATIENT
Start: 2019-05-26 | End: 2019-05-26 | Stop reason: HOSPADM

## 2019-05-26 RX ORDER — NEOSTIGMINE METHYLSULFATE 1 MG/ML
INJECTION INTRAVENOUS AS NEEDED
Status: DISCONTINUED | OUTPATIENT
Start: 2019-05-26 | End: 2019-05-26 | Stop reason: HOSPADM

## 2019-05-26 RX ORDER — MIDAZOLAM HYDROCHLORIDE 1 MG/ML
2 INJECTION, SOLUTION INTRAMUSCULAR; INTRAVENOUS ONCE
Status: DISCONTINUED | OUTPATIENT
Start: 2019-05-26 | End: 2019-05-26 | Stop reason: HOSPADM

## 2019-05-26 RX ORDER — HYDROMORPHONE HYDROCHLORIDE 2 MG/ML
0.5 INJECTION, SOLUTION INTRAMUSCULAR; INTRAVENOUS; SUBCUTANEOUS
Status: DISCONTINUED | OUTPATIENT
Start: 2019-05-26 | End: 2019-05-26 | Stop reason: HOSPADM

## 2019-05-26 RX ORDER — NALOXONE HYDROCHLORIDE 0.4 MG/ML
0.2 INJECTION, SOLUTION INTRAMUSCULAR; INTRAVENOUS; SUBCUTANEOUS
Status: DISCONTINUED | OUTPATIENT
Start: 2019-05-26 | End: 2019-05-26 | Stop reason: HOSPADM

## 2019-05-26 RX ORDER — ALBUTEROL SULFATE 0.83 MG/ML
2.5 SOLUTION RESPIRATORY (INHALATION) AS NEEDED
Status: DISCONTINUED | OUTPATIENT
Start: 2019-05-26 | End: 2019-05-26 | Stop reason: HOSPADM

## 2019-05-26 RX ORDER — FENTANYL CITRATE 50 UG/ML
100 INJECTION, SOLUTION INTRAMUSCULAR; INTRAVENOUS ONCE
Status: DISCONTINUED | OUTPATIENT
Start: 2019-05-26 | End: 2019-05-26 | Stop reason: HOSPADM

## 2019-05-26 RX ORDER — MIDAZOLAM HYDROCHLORIDE 1 MG/ML
2 INJECTION, SOLUTION INTRAMUSCULAR; INTRAVENOUS
Status: DISCONTINUED | OUTPATIENT
Start: 2019-05-26 | End: 2019-05-26 | Stop reason: HOSPADM

## 2019-05-26 RX ORDER — ROCURONIUM BROMIDE 10 MG/ML
INJECTION, SOLUTION INTRAVENOUS AS NEEDED
Status: DISCONTINUED | OUTPATIENT
Start: 2019-05-26 | End: 2019-05-26 | Stop reason: HOSPADM

## 2019-05-26 RX ADMIN — ROCURONIUM BROMIDE 50 MG: 10 INJECTION, SOLUTION INTRAVENOUS at 08:34

## 2019-05-26 RX ADMIN — FENTANYL CITRATE 25 MCG: 50 INJECTION, SOLUTION INTRAMUSCULAR; INTRAVENOUS at 09:22

## 2019-05-26 RX ADMIN — ONDANSETRON 4 MG: 2 INJECTION INTRAMUSCULAR; INTRAVENOUS at 10:47

## 2019-05-26 RX ADMIN — HEPARIN SODIUM 5000 UNITS: 5000 INJECTION INTRAVENOUS; SUBCUTANEOUS at 16:28

## 2019-05-26 RX ADMIN — HYDROMORPHONE HYDROCHLORIDE 0.5 MG: 2 INJECTION INTRAMUSCULAR; INTRAVENOUS; SUBCUTANEOUS at 11:38

## 2019-05-26 RX ADMIN — PROMETHAZINE HYDROCHLORIDE 6.25 MG: 25 INJECTION INTRAMUSCULAR; INTRAVENOUS at 11:35

## 2019-05-26 RX ADMIN — NEOSTIGMINE METHYLSULFATE 3 MG: 1 INJECTION INTRAVENOUS at 11:09

## 2019-05-26 RX ADMIN — FENTANYL CITRATE 25 MCG: 50 INJECTION, SOLUTION INTRAMUSCULAR; INTRAVENOUS at 10:52

## 2019-05-26 RX ADMIN — PROPOFOL 140 MG: 10 INJECTION, EMULSION INTRAVENOUS at 08:33

## 2019-05-26 RX ADMIN — FENTANYL CITRATE 25 MCG: 50 INJECTION, SOLUTION INTRAMUSCULAR; INTRAVENOUS at 09:06

## 2019-05-26 RX ADMIN — FENTANYL CITRATE 25 MCG: 50 INJECTION, SOLUTION INTRAMUSCULAR; INTRAVENOUS at 11:27

## 2019-05-26 RX ADMIN — SODIUM CHLORIDE, SODIUM LACTATE, POTASSIUM CHLORIDE, AND CALCIUM CHLORIDE 25 ML/HR: 600; 310; 30; 20 INJECTION, SOLUTION INTRAVENOUS at 07:00

## 2019-05-26 RX ADMIN — HEPARIN SODIUM 5000 UNITS: 5000 INJECTION INTRAVENOUS; SUBCUTANEOUS at 00:11

## 2019-05-26 RX ADMIN — FENTANYL CITRATE 25 MCG: 50 INJECTION, SOLUTION INTRAMUSCULAR; INTRAVENOUS at 09:11

## 2019-05-26 RX ADMIN — GLYCOPYRROLATE 0.4 MG: 0.2 INJECTION INTRAMUSCULAR; INTRAVENOUS at 11:09

## 2019-05-26 RX ADMIN — ONDANSETRON 4 MG: 2 INJECTION INTRAMUSCULAR; INTRAVENOUS at 05:26

## 2019-05-26 RX ADMIN — FENTANYL CITRATE 25 MCG: 50 INJECTION, SOLUTION INTRAMUSCULAR; INTRAVENOUS at 09:34

## 2019-05-26 RX ADMIN — FENTANYL CITRATE 100 MCG: 50 INJECTION, SOLUTION INTRAMUSCULAR; INTRAVENOUS at 08:33

## 2019-05-26 RX ADMIN — DEXAMETHASONE SODIUM PHOSPHATE 4 MG: 4 INJECTION, SOLUTION INTRA-ARTICULAR; INTRALESIONAL; INTRAMUSCULAR; INTRAVENOUS; SOFT TISSUE at 08:54

## 2019-05-26 RX ADMIN — FENTANYL CITRATE 25 MCG: 50 INJECTION, SOLUTION INTRAMUSCULAR; INTRAVENOUS at 09:19

## 2019-05-26 RX ADMIN — PIPERACILLIN SODIUM,TAZOBACTAM SODIUM 3.38 G: 3; .375 INJECTION, POWDER, FOR SOLUTION INTRAVENOUS at 16:27

## 2019-05-26 RX ADMIN — FENTANYL CITRATE 25 MCG: 50 INJECTION, SOLUTION INTRAMUSCULAR; INTRAVENOUS at 11:12

## 2019-05-26 RX ADMIN — ROCURONIUM BROMIDE 10 MG: 10 INJECTION, SOLUTION INTRAVENOUS at 10:02

## 2019-05-26 RX ADMIN — SODIUM CHLORIDE 100 ML/HR: 900 INJECTION, SOLUTION INTRAVENOUS at 14:09

## 2019-05-26 RX ADMIN — SODIUM CHLORIDE, SODIUM LACTATE, POTASSIUM CHLORIDE, AND CALCIUM CHLORIDE: 600; 310; 30; 20 INJECTION, SOLUTION INTRAVENOUS at 10:00

## 2019-05-26 RX ADMIN — PIPERACILLIN SODIUM,TAZOBACTAM SODIUM 3.38 G: 3; .375 INJECTION, POWDER, FOR SOLUTION INTRAVENOUS at 07:02

## 2019-05-26 RX ADMIN — HYDROMORPHONE HYDROCHLORIDE 0.5 MG: 2 INJECTION INTRAMUSCULAR; INTRAVENOUS; SUBCUTANEOUS at 11:47

## 2019-05-26 RX ADMIN — LIDOCAINE HYDROCHLORIDE 80 MG: 20 INJECTION, SOLUTION EPIDURAL; INFILTRATION; INTRACAUDAL; PERINEURAL at 08:33

## 2019-05-26 RX ADMIN — PIPERACILLIN SODIUM,TAZOBACTAM SODIUM 3.38 G: 3; .375 INJECTION, POWDER, FOR SOLUTION INTRAVENOUS at 00:11

## 2019-05-26 RX ADMIN — HYDROCODONE BITARTRATE AND ACETAMINOPHEN 1 TABLET: 5; 325 TABLET ORAL at 05:26

## 2019-05-26 RX ADMIN — Medication 10 ML: at 14:06

## 2019-05-26 NOTE — PROGRESS NOTES
Hospitalist Progress Note    2019  Admit Date: 2019  9:06 AM   NAME: Frida Sutton   :  1960   MRN:  [id number]   Attending: Kavin Desai MD  PCP:  Magdalena Cabezas DO    SUBJECTIVE:   Patient is a 52TH with hx recent admit for cholelithiasis presented with RUQ pain, again diagnosed with cholecystitis and cholelithiasis. Obstructing stone on MRCP, removed on ERCP . Cholecystectomy with liver bx on .    : lap estefany today, well-tolerated. Gangrenous gallbladder, drain in place, scant bloody drainage  No acute concerns, pain controlled postop. Review of Systems negative with exception of pertinent positives noted above  PHYSICAL EXAM     Visit Vitals  /66   Pulse (!) 55   Temp 98.3 °F (36.8 °C)   Resp 16   Ht 5' 5\" (1.651 m)   Wt 99.8 kg (220 lb)   SpO2 98%   BMI 36.61 kg/m²      Temp (24hrs), Av.4 °F (36.9 °C), Min:98 °F (36.7 °C), Max:98.9 °F (37.2 °C)    Oxygen Therapy  O2 Sat (%): 98 % (19 1318)  Pulse via Oximetry: 53 beats per minute (19 1219)  O2 Device: Nasal cannula (19 1209)  O2 Flow Rate (L/min): 3 l/min (19 1209)    Intake/Output Summary (Last 24 hours) at 2019 1400  Last data filed at 2019 1209  Gross per 24 hour   Intake 1370 ml   Output 3275 ml   Net -1905 ml      General: No acute distress    Lungs:  CTA Bilaterally. Heart:  Regular rate and rhythm,  No murmur, rub, or gallop  Abdomen: +BS, soft, appropriately tender postop, drain in place  Extremities: No cyanosis, clubbing or edema  Neurologic:  No focal deficits    XR ERCP / ERCB COMBINED   Final Result   Impression: ERCP images as above. Fluoroscopy time: 2:12 min. Total number of fluoro images obtained: 7      MRI ABD WO CONT   Final Result   1. Again well evident is cholelithiasis, imaging findings continue to be   suspicious for acute cholecystitis-as above. 2. CBD dilated measuring up to 12 mm at the ghulam hepatis.  There is mild   intrahepatic biliary tree dilatation. I am suspicious on the MRCP sequence of a   5 mm stone impacted at the ampulla as well- choledocholithiasis. US ABD LTD   Final Result   Gallbladder appears distended today with well evident cholelithiasis   as well as sludge and clear wall thickening now. CBD dilated now. Gallbladder   appearance is change from the prior study. Suspicious for acute cholecystitis. Correlate clinically. ASSESSMENT      Active Hospital Problems    Diagnosis Date Noted    Acute cholecystitis 05/24/2019    Acquired hypothyroidism 05/21/2018     Plan:    -Acute cholecystitis, choledocolithiasis  S/p ERCP, lap estefany. GI and surgery following. Gallbladder gangrenous in surgery, drain in place, continue zosyn  Pain/nausea management.    -Hypothyroidism  Cont hormone replacement     DVT Prophylaxis: hsq  Dispo: home when improved postop.     Signed By: Alesia Melgar MD     May 26, 2019

## 2019-05-26 NOTE — PROGRESS NOTES
TRANSFER - IN REPORT:    Verbal report received from Nahun Rivera RN(name) on Lex Simon  being received from PACU(unit) for routine progression of care      Report consisted of patients Situation, Background, Assessment and   Recommendations(SBAR). Information from the following report(s) SBAR, Kardex and Procedure Summary was reviewed with the receiving nurse. Opportunity for questions and clarification was provided. Assessment completed upon patients arrival to unit and care assumed.

## 2019-05-26 NOTE — PERIOP NOTES
TRANSFER - OUT REPORT:    Verbal report given to Efraín Peers on Blanka Campbell  being transferred to [facility] for routine post - op       Report consisted of patients Situation, Background, Assessment and   Recommendations(SBAR). Information from the following report(s) SBAR, OR Summary, Procedure Summary, Intake/Output and MAR was reviewed with the receiving nurse. Lines:   Peripheral IV 05/24/19 Left Antecubital (Active)   Site Assessment Clean, dry, & intact 5/26/2019 12:09 PM   Phlebitis Assessment 0 5/26/2019 12:09 PM   Infiltration Assessment 0 5/26/2019 12:09 PM   Dressing Status Clean, dry, & intact 5/26/2019 12:09 PM   Dressing Type Transparent;Tape 5/26/2019 12:09 PM   Hub Color/Line Status Infusing 5/26/2019 12:09 PM   Alcohol Cap Used No 5/26/2019 12:09 PM        Opportunity for questions and clarification was provided. Patient transported with:   O2 @ 3 liters    VTE prophylaxis orders have been written for Blanka Campbell. Patient and family given floor number and nurses name. Family updated re: pt status after security code verified.

## 2019-05-26 NOTE — ANESTHESIA POSTPROCEDURE EVALUATION
Procedure(s):  CHOLECYSTECTOMY LAPAROSCOPIC / LIVER BIOPSY. general    Anesthesia Post Evaluation      Multimodal analgesia: multimodal analgesia used between 6 hours prior to anesthesia start to PACU discharge  Patient location during evaluation: PACU  Patient participation: complete - patient participated  Level of consciousness: responsive to verbal stimuli  Pain management: adequate  Airway patency: patent  Anesthetic complications: no  Cardiovascular status: acceptable  Respiratory status: acceptable, spontaneous ventilation and nonlabored ventilation  Hydration status: acceptable  Post anesthesia nausea and vomiting:  controlled      Vitals Value Taken Time   /69 5/26/2019 12:19 PM   Temp 36.7 °C (98 °F) 5/26/2019 12:09 PM   Pulse 50 5/26/2019 12:20 PM   Resp 16 5/26/2019 12:13 PM   SpO2 97 % 5/26/2019 12:20 PM   Vitals shown include unvalidated device data.

## 2019-05-26 NOTE — PERIOP NOTES
TRANSFER - IN REPORT:    Verbal report received from FRANK Gomez on Imelda Howard being received from 210 for ordered procedure      Report consisted of patients Situation, Background, Assessment and Recommendations(SBAR). Information from the following report(s) SBAR, Kardex, [facility], Recent Results and Procedure Verification was reviewed with the receiving nurse. Opportunity for questions and clarification was provided. Assessment completed upon patients arrival to unit and care assumed.

## 2019-05-26 NOTE — PROGRESS NOTES
GI Brief Note    Attempted to see patient but she was gone for her CCY. Will see the patient tomorrow.     Yue Guerrero MD   [facility]

## 2019-05-26 NOTE — BRIEF OP NOTE
BRIEF OPERATIVE NOTE    Date of Procedure: 5/26/2019   Preoperative Diagnosis: Cholecystitis  Postoperative Diagnosis: Gangrenous Cholecystitis, Fatty liver   Procedure(s):  CHOLECYSTECTOMY LAPAROSCOPIC / LIVER BIOPSY  Surgeon(s) and Role:     * Naomi Luis MD - Primary         Surgical Assistant: none    Surgical Staff:  Circ-1: Marito Miller RN  Scrub Tech-1: Vi Barling  Scrub Tech-2: [place], [place]  Event Time In Time Out   Incision Start 05/26/2019 0856    Incision Close 05/26/2019 1126      Anesthesia: General   Estimated Blood Loss: <60 cc  Specimens:   ID Type Source Tests Collected by Time Destination   1 : Gallbladder Preservative Gallbladder  Naomi Luis MD 5/26/2019 1053 Pathology   2 : Liver Biopsy Preservative Liver specimen  Naomi Luis MD 5/26/2019 1045 Pathology      Findings: fatty and enlarged liver with smooth surface, gangrenous GB without perforation. Combined bottom-up/top-down dissection. Duct liagetd with 0-PDS Endoloop. TNTC cholesterol gallstones. Biopsy taken from liver segment IVb (photo). #15 Denys drain in subhepatic space.   Complications: none apparent  Implants: * No implants in log *    Donnie Keith MD

## 2019-05-27 VITALS
WEIGHT: 220 LBS | BODY MASS INDEX: 36.65 KG/M2 | TEMPERATURE: 99 F | SYSTOLIC BLOOD PRESSURE: 125 MMHG | HEART RATE: 64 BPM | DIASTOLIC BLOOD PRESSURE: 79 MMHG | HEIGHT: 65 IN | RESPIRATION RATE: 17 BRPM | OXYGEN SATURATION: 94 %

## 2019-05-27 LAB
ALBUMIN SERPL-MCNC: 2.7 G/DL (ref 3.5–5)
ALBUMIN/GLOB SERPL: 0.9 {RATIO} (ref 1.2–3.5)
ALP SERPL-CCNC: 477 U/L (ref 50–136)
ALT SERPL-CCNC: 803 U/L (ref 12–65)
ANION GAP SERPL CALC-SCNC: 8 MMOL/L (ref 7–16)
AST SERPL-CCNC: 316 U/L (ref 15–37)
BILIRUB DIRECT SERPL-MCNC: 0.3 MG/DL
BILIRUB SERPL-MCNC: 0.8 MG/DL (ref 0.2–1.1)
BUN SERPL-MCNC: 6 MG/DL (ref 6–23)
CALCIUM SERPL-MCNC: 8.4 MG/DL (ref 8.3–10.4)
CHLORIDE SERPL-SCNC: 107 MMOL/L (ref 98–107)
CO2 SERPL-SCNC: 28 MMOL/L (ref 21–32)
CREAT SERPL-MCNC: 0.78 MG/DL (ref 0.6–1)
ERYTHROCYTE [DISTWIDTH] IN BLOOD BY AUTOMATED COUNT: 13.5 % (ref 11.9–14.6)
GLOBULIN SER CALC-MCNC: 3.1 G/DL (ref 2.3–3.5)
GLUCOSE SERPL-MCNC: 71 MG/DL (ref 65–100)
HCT VFR BLD AUTO: 42.3 % (ref 35.8–46.3)
HGB BLD-MCNC: 13.8 G/DL (ref 11.7–15.4)
MCH RBC QN AUTO: 31.9 PG (ref 26.1–32.9)
MCHC RBC AUTO-ENTMCNC: 32.6 G/DL (ref 31.4–35)
MCV RBC AUTO: 97.7 FL (ref 79.6–97.8)
NRBC # BLD: 0 K/UL (ref 0–0.2)
PLATELET # BLD AUTO: 263 K/UL (ref 150–450)
PMV BLD AUTO: 10.4 FL (ref 9.4–12.3)
POTASSIUM SERPL-SCNC: 3.5 MMOL/L (ref 3.5–5.1)
PROT SERPL-MCNC: 5.8 G/DL (ref 6.3–8.2)
RBC # BLD AUTO: 4.33 M/UL (ref 4.05–5.2)
SODIUM SERPL-SCNC: 143 MMOL/L (ref 136–145)
WBC # BLD AUTO: 8.3 K/UL (ref 4.3–11.1)

## 2019-05-27 PROCEDURE — 80048 BASIC METABOLIC PNL TOTAL CA: CPT

## 2019-05-27 PROCEDURE — 36415 COLL VENOUS BLD VENIPUNCTURE: CPT

## 2019-05-27 PROCEDURE — 77030020263 HC SOL INJ SOD CL0.9% LFCR 1000ML

## 2019-05-27 PROCEDURE — 74011250636 HC RX REV CODE- 250/636: Performed by: INTERNAL MEDICINE

## 2019-05-27 PROCEDURE — 74011000258 HC RX REV CODE- 258: Performed by: INTERNAL MEDICINE

## 2019-05-27 PROCEDURE — 80076 HEPATIC FUNCTION PANEL: CPT

## 2019-05-27 PROCEDURE — 85027 COMPLETE CBC AUTOMATED: CPT

## 2019-05-27 PROCEDURE — 74011250637 HC RX REV CODE- 250/637: Performed by: INTERNAL MEDICINE

## 2019-05-27 RX ORDER — HYDROCODONE BITARTRATE AND ACETAMINOPHEN 5; 325 MG/1; MG/1
1 TABLET ORAL
Qty: 30 TAB | Refills: 0 | Status: SHIPPED | OUTPATIENT
Start: 2019-05-27 | End: 2019-05-30

## 2019-05-27 RX ORDER — ONDANSETRON 4 MG/1
4 TABLET, FILM COATED ORAL
Qty: 30 TAB | Refills: 0 | Status: SHIPPED | OUTPATIENT
Start: 2019-05-27

## 2019-05-27 RX ADMIN — LEVOTHYROXINE SODIUM 137 MCG: 50 TABLET ORAL at 06:26

## 2019-05-27 RX ADMIN — HYDROCODONE BITARTRATE AND ACETAMINOPHEN 1 TABLET: 5; 325 TABLET ORAL at 09:21

## 2019-05-27 RX ADMIN — HEPARIN SODIUM 5000 UNITS: 5000 INJECTION INTRAVENOUS; SUBCUTANEOUS at 07:31

## 2019-05-27 RX ADMIN — PIPERACILLIN SODIUM,TAZOBACTAM SODIUM 3.38 G: 3; .375 INJECTION, POWDER, FOR SOLUTION INTRAVENOUS at 00:23

## 2019-05-27 RX ADMIN — PIPERACILLIN SODIUM,TAZOBACTAM SODIUM 3.38 G: 3; .375 INJECTION, POWDER, FOR SOLUTION INTRAVENOUS at 07:33

## 2019-05-27 RX ADMIN — HEPARIN SODIUM 5000 UNITS: 5000 INJECTION INTRAVENOUS; SUBCUTANEOUS at 00:23

## 2019-05-27 NOTE — PROGRESS NOTES
Patient instructed on how to empty and activate/charge drain, patient verbalized understanding. Patient given container to empty drain and extra dressings. Abdominal dressings clean, dry and intact. PIV removed. Patient denies pain and nausea.   Condition stable

## 2019-05-27 NOTE — DISCHARGE INSTRUCTIONS
Leave outer dressings for 3 more days, then remove. Leave steristrip dressings intact until seen in follow up. Keep diary of drain output and call if changes to deep yellow or green color    Clear liquid diet today then advance as tolerated over next few days as nausea improves and function becomes more normal.  OK to be on liquids for a few days. OK to shower but do not spray water directly into wounds and protect drain site. No heavy lifting (>10lbs) for 6 weeks to reduce risk of disrupting repair. May resume normal activity including walking, which is encouraged to reduce risk of blood clots  . No driving until you are completely off pain meds for 24hrs and have no pain with movements associated with driving. Pain and nausea prescriptions to use as needed  (rec Norco 5/325 #40, Zofran ODT #30)         Learning About Cholecystectomy  What is cholecystectomy  Cholecystectomy (say \"koh-nena-sis-SAMUEL-tuh-beth\") is surgery to remove the gallbladder and gallstones. The gallbladder stores bile made by your liver. The bile helps you digest fats. Gallstones are made of cholesterol and other things found in bile. Your body will work fine without a gallbladder. Bile will go straight from the liver to the intestine. There may be small changes in how you digest food. But you probably will not notice them. How is the surgery done? Cholecystectomy is usually laparoscopic surgery. To do this type of surgery, a doctor puts a lighted tube, or scope, and other surgical tools through small cuts (incisions) in your belly. The doctor is able to see your organs with the scope. After your gallbladder is removed, you will no longer have gallstones. The cuts leave scars that usually fade with time. Open surgery may be done if problems are found during laparoscopic surgery. With open surgery, the gallbladder is removed through one larger cut in your belly. And the hospital stay is longer.   What can you expect after surgery? It is normal to feel weak and tired for several days after you return home. Your belly may be swollen. If you had laparoscopic surgery, you may also have pain in your shoulder for about 24 hours. You may have gas or need to burp a lot at first.  A few people get diarrhea. The diarrhea usually goes away in 2 to 4 weeks. But it may last longer. How quickly you get better depends on which kind of surgery you had. For laparoscopic surgery, most people can go back to work or their normal routine in 1 to 2 weeks. It depends on the type of work you do and how you feel. If you have open surgery, it will probably take 4 to 6 weeks before you get back to your normal routine. Follow-up care is a key part of your treatment and safety. Be sure to make and go to all appointments, and call your doctor if you are having problems. It's also a good idea to know your test results and keep a list of the medicines you take. Where can you learn more? Go to http://alison-jairo.info/. Enter A499 in the search box to learn more about \"Learning About Cholecystectomy. \"  Current as of: March 27, 2018  Content Version: 11.9  © 9519-3609 [employer]. Care instructions adapted under license by [employer] (which disclaims liability or warranty for this information). If you have questions about a medical condition or this instruction, always ask your healthcare professional. [place] [address] any warranty or liability for your use of this information.          DISCHARGE SUMMARY from Nurse    PATIENT INSTRUCTIONS:    After general anesthesia or intravenous sedation, for 24 hours or while taking prescription Narcotics:  · Limit your activities  · Do not drive and operate hazardous machinery  · Do not make important personal or business decisions  · Do  not drink alcoholic beverages  · If you have not urinated within 8 hours after discharge, please contact your surgeon on call.    Report the following to your surgeon:  · Excessive pain, swelling, redness or odor of or around the surgical area  · Temperature over 100.5  · Nausea and vomiting lasting longer than 4 hours or if unable to take medications  · Any signs of decreased circulation or nerve impairment to extremity: change in color, persistent  numbness, tingling, coldness or increase pain  · Any questions    What to do at Home:  Recommended activity: Activity as tolerated,     If you experience any of the following symptoms fever greater then 100.5, increase in shortness of breath, pain unrelieved by medictaion, please follow up with primary care doctor . *  Please give a list of your current medications to your Primary Care Provider. *  Please update this list whenever your medications are discontinued, doses are      changed, or new medications (including over-the-counter products) are added. *  Please carry medication information at all times in case of emergency situations. These are general instructions for a healthy lifestyle:    No smoking/ No tobacco products/ Avoid exposure to second hand smoke  Surgeon General's Warning:  Quitting smoking now greatly reduces serious risk to your health. Obesity, smoking, and sedentary lifestyle greatly increases your risk for illness    A healthy diet, regular physical exercise & weight monitoring are important for maintaining a healthy lifestyle    You may be retaining fluid if you have a history of heart failure or if you experience any of the following symptoms:  Weight gain of 3 pounds or more overnight or 5 pounds in a week, increased swelling in our hands or feet or shortness of breath while lying flat in bed. Please call your doctor as soon as you notice any of these symptoms; do not wait until your next office visit.     Recognize signs and symptoms of STROKE:    F-face looks uneven    A-arms unable to move or move unevenly    S-speech slurred or non-existent    T-time-call 911 as soon as signs and symptoms begin-DO NOT go       Back to bed or wait to see if you get better-TIME IS BRAIN. Warning Signs of HEART ATTACK     Call 911 if you have these symptoms:   Chest discomfort. Most heart attacks involve discomfort in the center of the chest that lasts more than a few minutes, or that goes away and comes back. It can feel like uncomfortable pressure, squeezing, fullness, or pain.  Discomfort in other areas of the upper body. Symptoms can include pain or discomfort in one or both arms, the back, neck, jaw, or stomach.  Shortness of breath with or without chest discomfort.  Other signs may include breaking out in a cold sweat, nausea, or lightheadedness. Don't wait more than five minutes to call 911 - MINUTES MATTER! Fast action can save your life. Calling 911 is almost always the fastest way to get lifesaving treatment. Emergency Medical Services staff can begin treatment when they arrive -- up to an hour sooner than if someone gets to the hospital by car. The discharge information has been reviewed with the patient. The patient verbalized understanding. Discharge medications reviewed with the patient and appropriate educational materials and side effects teaching were provided.   ___________________________________________________________________________________________________________________________________

## 2019-05-27 NOTE — DISCHARGE SUMMARY
Hospitalist Discharge Summary     Patient ID:  Kayli Gilliam  [id number]  82 y.o.  1960  Admit date: 5/24/2019  9:06 AM  Discharge date and time: 5/27/2019  Attending: Avi Antunez MD  PCP:  Janessa Stratton DO  Treatment Team: Attending Provider: Avi Antunez MD; Consulting Provider: Karyn Jarrett MD; Consulting Provider: Gary Ellsworth MD; Care Manager: Mitul Phelps RN; Utilization Review: Hector Snow RN    Principal Diagnosis Acute cholecystitis   Principal Problem:    Acute cholecystitis (5/24/2019)    Active Problems:    Acquired hypothyroidism (5/21/2018)             Hospital Course:  Please refer to the admission H&P for details of presentation. In summary, the patient is a 66yo with hx recent admit for cholelithiasis who presented with cholelithiasis and cholecystitis. She had ERCP with removal of stone on 5/25 followed by cholecystectomy with Dr. Tracey Antoine on 5/26 with gangrenous appearing gallbladder. VIDAL drain left in place, to be likely removed on follow up in 1w with Dr. Tracey Antoine. Significant Diagnostic Studies:   XR ERCP / ERCB COMBINED   Final Result   Impression: ERCP images as above. Fluoroscopy time: 2:12 min. Total number of fluoro images obtained: 7      MRI ABD WO CONT   Final Result   1. Again well evident is cholelithiasis, imaging findings continue to be   suspicious for acute cholecystitis-as above. 2. CBD dilated measuring up to 12 mm at the ghulam hepatis. There is mild   intrahepatic biliary tree dilatation. I am suspicious on the MRCP sequence of a   5 mm stone impacted at the ampulla as well- choledocholithiasis. US ABD LTD   Final Result   Gallbladder appears distended today with well evident cholelithiasis   as well as sludge and clear wall thickening now. CBD dilated now. Gallbladder   appearance is change from the prior study. Suspicious for acute cholecystitis. Correlate clinically.                   Labs: Results:       Chemistry Recent Labs     05/27/19 0429 05/26/19 0556 05/25/19  0605   GLU 71 83 105*    144 143   K 3.5 3.5 3.3*    109* 109*   CO2 28 27 27   BUN 6 5* 6   CREA 0.78 0.84 0.70   CA 8.4 8.6 8.3   AGAP 8 8 7   * 598* 476*   TP 5.8* 6.3 5.6*   ALB 2.7* 3.1* 2.9*   GLOB 3.1 3.2 2.7   AGRAT 0.9* 1.0* 1.1*      CBC w/Diff Recent Labs     05/27/19 0429 05/26/19  0556 05/25/19  0605   WBC 8.3 5.9 7.0   RBC 4.33 4.57 4.33   HGB 13.8 14.6 14.0   HCT 42.3 45.1 41.2    272 272   GRANS  --   --  74   LYMPH  --   --  16   EOS  --   --  2      Cardiac Enzymes No results for input(s): CPK, CKND1, DENA in the last 72 hours. No lab exists for component: CKRMB, TROIP   Coagulation Recent Labs     05/26/19 0556   PTP 13.1   INR 1.0       Lipid Panel Lab Results   Component Value Date/Time    Cholesterol, total 201 (H) 10/09/2018 04:18 PM    HDL Cholesterol 56 10/09/2018 04:18 PM    LDL, calculated 125 (H) 10/09/2018 04:18 PM    VLDL, calculated 20 10/09/2018 04:18 PM    Triglyceride 98 10/09/2018 04:18 PM      BNP No results for input(s): BNPP in the last 72 hours. Liver Enzymes Recent Labs     05/27/19 0429   TP 5.8*   ALB 2.7*   *   SGOT 316*      Thyroid Studies Lab Results   Component Value Date/Time    TSH 0.149 (L) 10/09/2018 04:18 PM            Discharge Exam:  Visit Vitals  /79   Pulse 64   Temp 99 °F (37.2 °C)   Resp 17   Ht 5' 5\" (1.651 m)   Wt 99.8 kg (220 lb)   SpO2 94%   BMI 36.61 kg/m²       General:          No acute distress    Lungs:             CTA Bilaterally. Heart:              Regular rate and rhythm,  No murmur, rub, or gallop  Abdomen:        +BS, soft, appropriately tender postop, drain in place  Extremities:     No cyanosis, clubbing or edema  Neurologic:       No focal deficits        Disposition: home  Discharge Condition: stable  Patient Instructions:   Leave outer dressings for 3 more days, then remove. Leave steristrip dressings intact until seen in follow up.   Keep diary of drain output and call if changes to deep yellow or green color  Clear liquid diet today then advance as tolerated over next few days as nausea improves and function becomes more normal.  OK to be on liquids for a few days. OK to shower but do not spray water directly into wounds and protect drain site. No heavy lifting (>10lbs) for 6 weeks to reduce risk of disrupting repair. May resume normal activity including walking, which is encouraged to reduce risk of blood clots. No driving until you are completely off pain meds for 24hrs and have no pain with movements associated with driving. Current Discharge Medication List      START taking these medications    Details   HYDROcodone-acetaminophen (NORCO) 5-325 mg per tablet Take 1 Tab by mouth every four (4) hours as needed for Pain for up to 3 days. Max Daily Amount: 6 Tabs. Qty: 30 Tab, Refills: 0    Associated Diagnoses: Calculus of gallbladder with acute cholecystitis without obstruction         CONTINUE these medications which have CHANGED    Details   ondansetron hcl (ZOFRAN) 4 mg tablet Take 1 Tab by mouth every eight (8) hours as needed for Nausea. Qty: 30 Tab, Refills: 0         CONTINUE these medications which have NOT CHANGED    Details   levothyroxine (SYNTHROID) 137 mcg tablet Take 137 mcg by mouth Daily (before breakfast). Indications: hypothyroidism, New dose  Qty: 90 Tab, Refills: 1    Associated Diagnoses: Acquired hypothyroidism      gabapentin (NEURONTIN) 300 mg capsule 1 in am, 1 in afternoon, 2 at bedtime.   Indications: NEUROPATHIC PAIN  Qty: 120 Cap, Refills: 3    Associated Diagnoses: Chronic cervical pain         STOP taking these medications       traMADol (ULTRAM) 50 mg tablet Comments:   Reason for Stopping:               Activity: See surgical instructions  Diet: advance as tolerated    Follow-up:     Follow-up Appointments   Procedures    FOLLOW UP VISIT Appointment in: One Week Call [facility] for appt time at [phone] to see Dr. Giuliana Dyer on Monday 6/3     Call [facility] for appt time at [phone] to see Dr. Giuliana Dyer on Monday 6/3     Standing Status:   Standing     Number of Occurrences:   1     Order Specific Question:   Appointment in     Answer:    One Week           Time spent to discharge patient 35 minutes  Signed:  Cristela Lim MD  5/27/2019  10:27 AM

## 2019-05-27 NOTE — PROGRESS NOTES
[facility] DAILY PROGRESS NOTE    Admit Date:  5/24/2019    CC:  Choledocholithiasis s/p ERCP with stone extraction and s/p CCY yesterday    Problem List:  Principal Problem:    Acute cholecystitis (5/24/2019)    Active Problems:    Acquired hypothyroidism (5/21/2018)    Mrs. Lex Simon is a 66 y.o. female with PMH of Thyroid disease, who is seen in consultation at the request of Dr. Keyur Houser for Hyperbilirubinemia, Cholelithiassis and CBD dilation. She was admitted 5/24 with RUQ abdominal pain, N/V. Labs revealed Tbili 1.7, Alk phos 265, , . WBC WNL. RUQ US revealed distended gallbladder concern for cholecystitis with cholelithiasis, CBD 10mm. MRCP showed cholelithiasis and ongoing concern for acute cholecystitis. CBD was again dilated- measuring up to 12mm with mild intrahepatic biliary ductal dilatation and suspicion for 5mm stone impacted at the ampulla. Note that she was admitted 2/2019 for cholecystitis. ERCP then on 2/6/19 with sphincterotomy and stone extraction. Cholecystectomy planned though pt had to postpone surgery. ERCP on 5/25 was done and a single stone was removed from the CBD. CCY was done on 5/26 which revealed a gangrenous gallbladder without perforation and cholesterol gallstones. Biopsy was also taken from a fatty and enlarged liver with smooth surface. Drain left in place. Pathology is pending. LFTs trending down. Patient feels well and is ready for discharge. - Continue to monitor LFTs  - Follow up pathology from liver biopsy. - Appears to be fatty liver. Patient advised on exercise, weight loss and diet low in fat, carbs and sugar.    - Patient can follow up with us in GI clinic in 6-8 weeks. - Will sign off. Thank you for the consult and allowing us to participate in the care of this patient. Kari Nelson MD   [facility]          Subjective:     Patient is s/p CCY yesterday and also had a liver biopsy.   Drain was left in place. She feels good today. Ready to go home. Medications:   Current Facility-Administered Medications   Medication Dose Route Frequency Provider Last Rate Last Dose    sodium chloride (NS) flush 5-40 mL  5-40 mL IntraVENous Q8H Darya Gardner MD   Stopped at 05/26/19 2200    sodium chloride (NS) flush 5-40 mL  5-40 mL IntraVENous PRN Darya Gardner MD        heparin (porcine) injection 5,000 Units  5,000 Units SubCUTAneous Q8H Darya Gardner MD   5,000 Units at 05/27/19 0731    ondansetron (ZOFRAN) injection 4 mg  4 mg IntraVENous Q4H PRN Artemio Cr MD   4 mg at 05/26/19 0526    HYDROcodone-acetaminophen (NORCO) 5-325 mg per tablet 1 Tab  1 Tab Oral Q4H PRN Darya Gardner MD   1 Tab at 05/26/19 0526    morphine injection 1 mg  1 mg IntraVENous Q4H PRN Artemio Cr MD        levothyroxine (SYNTHROID) tablet 137 mcg  137 mcg Oral ACB Artemio Cr MD   137 mcg at 05/27/19 5376    0.9% sodium chloride infusion  100 mL/hr IntraVENous CONTINUOUS Artemio Cr  mL/hr at 05/26/19 1409 100 mL/hr at 05/26/19 1409    piperacillin-tazobactam (ZOSYN) 3.375 g in 0.9% sodium chloride (MBP/ADV) 100 mL  3.375 g IntraVENous Q8H Artemio Cr MD 25 mL/hr at 05/27/19 0733 3.375 g at 05/27/19 9013       Review of Systems:  ROS was obtained, with pertinent positives as listed above. No chest pain or SOB. Diet:      Objective:   Vitals:  Visit Vitals  /79   Pulse 64   Temp 99 °F (37.2 °C)   Resp 17   Ht 5' 5\" (1.651 m)   Wt 99.8 kg (220 lb)   SpO2 94%   BMI 36.61 kg/m²     Intake/Output:  05/27 0701 - 05/27 1900  In: 431 [I.V.:456]  Out: 50 [Drains:50]  05/25 1901 - 05/27 0700  In: 0262 [I.V.:2494]  Out: 6311 [Urine:2975; Drains:50]  Exam:  General appearance: alert, cooperative, no distress. Sitting up in bedside chair.     Lungs: clear to auscultation bilaterally anteriorly  Heart: regular rate and rhythm  Abdomen: soft, non-tender. Drain noted. Obese.   Bowel sounds normal. No masses, no organomegaly  Extremities: extremities normal, atraumatic, no cyanosis or edema  Neuro:  alert and oriented    Data Review (Labs):    Recent Labs     05/27/19  0429 05/26/19  0556 05/25/19  0605 05/24/19  0908   WBC 8.3 5.9 7.0 6.4   HGB 13.8 14.6 14.0 15.8*   HCT 42.3 45.1 41.2 47.4*    272 272 358   MCV 97.7 98.7* 95.2 96.5    144 143 140   K 3.5 3.5 3.3* 3.9    109* 109* 103   CO2 28 27 27 29   BUN 6 5* 6 9   MG  --   --  2.0  --    PHOS  --   --  3.1  --    * 1,273* 1,338* 590*   INR  --  1.0  --   --          Chadd Bass MD  [facility]

## 2019-05-27 NOTE — PROGRESS NOTES
Discharged to home. To discharge area via wheelchair accompanied by staff. To home with her sister.   Condition stable at discharge

## 2019-05-27 NOTE — PROGRESS NOTES
[facility] [address] , [address] [place] [address]  [phone]    Susana Pickering is a 66 y.o. female who is seen 1 day post-op after laparoscopic cholecystectomy following ERCP duct clearance for choledocholithiasis. She also had acute cholecystitis and at surgery she was found to have gangrenous cholecystitis. She was able to undergo lap estefany and has drain in place. Doing well  VIDAL with serosanguineous (no bile)  Dressings intact  Tolerating CLD    Leave outer dressings for 3 more days, then remove. Leave steristrip dressings intact until seen in follow up. Keep diary of drain output and call if changes to deep yellow or green color  Clear liquid diet today then advance as tolerated over next few days as nausea improves and function becomes more normal.  OK to be on liquids for a few days. OK to shower but do not spray water directly into wounds and protect drain site. No heavy lifting (>10lbs) for 6 weeks to reduce risk of disrupting repair. May resume normal activity including walking, which is encouraged to reduce risk of blood clots. No driving until you are completely off pain meds for 24hrs and have no pain with movements associated with driving.    Pain and nausea prescriptions to use as needed  (rec Norco 5/325 #40, Zofran ODT #30)  Discussed with Dr. Wall Monday with Dr Morales Youssef in 1 week ([phone]) Monday 6/3      Problem List  Date Reviewed: 5/26/2019          Codes Class Noted    * (Principal) Acute cholecystitis ICD-10-CM: K81.0  ICD-9-CM: 575.0  5/24/2019        Choledocholithiasis with acute cholecystitis ICD-10-CM: K80.42  ICD-9-CM: 574.30  2/5/2019        Abdominal pain ICD-10-CM: R10.9  ICD-9-CM: 789.00  2/5/2019        Severe obesity with body mass index (BMI) of 35.0 to 39.9 with serious comorbidity ([facility] [address] ICD-10-CM: E66.01  ICD-9-CM: 278.01  7/3/2018        Acquired hypothyroidism ICD-10-CM: E03.9  ICD-9-CM: 244.9  5/21/2018        Chronic cervical pain ICD-10-CM: M54.2, G89.29  ICD-9-CM: 723.1, 338.29  5/21/2018    Overview Addendum 8/10/2018  5:34 PM by Minh Stiles DO     Pt is also on gabapentin. Prescribed by her PCP due to Musculoskeletal issues. Pt says she was told her vertebrae are crushed together and another vertebrae has a bone spur, impinging on nerve and making her right arm go numb. Pt reports works well. Denies adr.  MRI scanned, 1/29/2015  C2-3, moderate right facet hyeprtrophy and mild narrowing of the right neural foramen, and 2mm anterolisthesis of C2 on C3.  1-2mm posterior disc protrusion at T1-2, partiall visualized. Mild DDD and spondylosis from C3-4 throug C6-7, but w/o central spinal stenosis or neural formainla narrowing.                     Mary Damon MD

## 2019-05-27 NOTE — PROGRESS NOTES
Discharge instructions given to the pt's. Drain teaching done with the pt at this time and pt voiced a understanding. The pt was receiving Zosyn, so the IV was not removed at this time. The primary care nurse has been informed.

## 2019-05-27 NOTE — PROGRESS NOTES
END OF SHIFT NOTE:    INTAKE/OUTPUT  05/26 0701 - 05/27 0700  In: 2494 [I.V.:2494]  Out: 0905 [Urine:2075; Drains:50]  Voiding: YES  Catheter: NO  Drain:   Hailey Solomon #1 05/26/19 Anterior Abdomen (Active)   Site Assessment Clean, dry, & intact 5/27/2019  3:20 AM   Dressing Status Clean, dry, & intact 5/27/2019  3:20 AM   Drainage Description Sanguinous 5/27/2019  3:20 AM   Denys Drain Airleak No 5/26/2019  1:30 PM   Status Patent; Charged;Draining 5/26/2019  1:30 PM   Y Connector Used No 5/26/2019  1:30 PM   Output (ml) 50 ml 5/27/2019  3:20 AM               Flatus: Patient does not have flatus present. Stool:  0 occurrences. Characteristics:       Emesis: 0 occurrences. Characteristics:        VITAL SIGNS  Patient Vitals for the past 12 hrs:   Temp Pulse Resp BP SpO2   05/27/19 0319 98.4 °F (36.9 °C) 64 17 109/72 91 %   05/26/19 2339 98.1 °F (36.7 °C) (!) 54 16 119/65 97 %   05/26/19 1958 98.2 °F (36.8 °C) (!) 56 17 124/78 97 %       Pain Assessment  Pain Intensity 1: 0 (05/27/19 0320)  Pain Location 1: Abdomen  Pain Intervention(s) 1: Medication (see MAR), Emotional support  Patient Stated Pain Goal: 0    Ambulating  Yes    Shift report given to oncoming nurse at the bedside.     Yoselin Erwin RN

## 2019-05-29 LAB
BACTERIA SPEC CULT: NORMAL
BACTERIA SPEC CULT: NORMAL
SERVICE CMNT-IMP: NORMAL
SERVICE CMNT-IMP: NORMAL

## 2019-05-30 NOTE — OP NOTES
[address], [address] [facility] [place]  [phone]    OPERATVE REPORT    Name: Dexter Arriola     Date of Surgery: 5/26/2019  Med Record Number: [id number]   Age: 66 y.o. Sex: female   Preoperative Diagnosis: Cholelithiasis with Acute Cholecystitis  Postoperative Diagnosis: Cholelithiasis with Acute Gangrenous Cholecystitis, Fatty liver   Procedure(s):  CHOLECYSTECTOMY LAPAROSCOPIC / LIVER BIOPSY  Surgeon(s) and Role:     * Yin White MD - Primary         Surgical Assistant: none     Surgical Staff:  Circ-1: Katina Davey RN  Scrub Tech-1: Sheri Ruth  Scrub Tech-2: Kandi Rasmussen  Event Time In Time Out   Incision Start 05/26/2019 0856     Incision Close 05/26/2019 1126        Anesthesia: General   Estimated Blood Loss: <60 cc  Specimens:   ID Type Source Tests Collected by Time Destination   1 : Gallbladder Preservative Gallbladder   Yin White MD 5/26/2019 1053 Pathology   2 : Liver Biopsy Preservative Liver specimen   Yin White MD 5/26/2019 1045 Pathology      Findings: fatty and enlarged liver with smooth surface, gangrenous GB without perforation. Combined bottom-up/top-down dissection. Duct liagetd with 0-PDS Endoloop. TNTC cholesterol gallstones. Biopsy taken from liver segment IVb (photo). #15 Denys drain in subhepatic space. Complications: none apparent  Implants: * No implants in log *     Procedure Description:   The risks, benefits, potential complications, treatment options, and expected outcomes were discussed with the patient pre-operatively. The patient voiced understanding and gave informed consent preoperatively. The patient was taken to the Operating Room, and the [address] [place] [address] time-out protocol checklist was followed. After the induction of adequate anesthesia, the abdomen was prepped and draped in the usual sterile fashion. Preoperative antibiotics were given on schedule.   Patient underwent ERCP the day prior for common duct clearance. A supraumbilical incision was made and a cut down approach was used to place a balloon tip Spike trocar under direct vision. After adequate pneumoperitioneum, two 5mm static and dynamic retraction ports were placed and an 11mm trocar also placed, all in the usual locations. Fatty enlarged liver with blunted edges, but smooth surface and normal pliability. No signs of cirrhosis grossly. Surprisingly, the gallbladder was completely gangrenous but fortunately without evidence of perforation. The apex of the fundus was without adhesions and to decrease the risk of perforation and spillage, a needle trocar was used to aspirate 60 cc of dark green almost black bile. The decompressed gallbladder could be easily grasped for cephalad retraction. Inflammatory adhesions to the gallbladder were bluntly taken down which revealed a hemorrhagic imprint in the adherent omentum. Other more chronic adhesions were divided using the monopolar electrosurgical hook device. Care was taken not to apply energy close to any bowel attachments. With cephalad retraction additional adherent duodenum was carefully peeled away from the infundibulum and thereby exposing the hepatocystic triangle. The tissues were also acutely inflamed and tedious dissection was carried out to identify the structures of the hepatocystic triangle. The dissection of the structures and the separation of the gallbladder from the liver were extremely tedious. Safe dissection was carried out throughout and no structures were divided until the critical view was obtained. The addition of at least 60 minutes was required to perform this dissection which would alternate from bottom up to top-down and back again as needed to continue the dissection. During the top-down dissection, the upper fundus was densely adherent to the gallbladder fossa. At the periphery, the gallbladder was completely excised and the liver bed was exposed.   This plane was hemorrhagic and instead of continuing in this plane, the gallbladder was fenestrated to leave a section of posterior gallbladder wall attached to the liver. Fortunately, inferiorly the gallbladder was able to be dissected free from the liver peritoneal plane thereby allowing the true critical view with one cystic duct and one cystic artery entering the gallbladder. The gallbladder was filled with too numerous to count small cholesterol stones. The majority of the stones were able to remain within the open gallbladder. Any spilled stones were retrieved. The gallbladder wall division was carried out using the hook monopolar device as well. The only remaining attachment was the gallbladder  from the liver with the posterior wall partially removed attached by the cystic duct to the common duct. A 0 PDS Endoloop was then used to ligate the cystic duct. The tissues were thickened but a good closure was obtained. The duct was divided with adequate extra cuff length. The gallbladder was placed in an Endo Catch bag and set aside above the liver. Careful inspection was made for any spilled stones. These were retrieved. This area was then irrigated with saline and inspected for hemostasis. The posterior gallbladder wall was further fulgurated. Attention was now directed at performing a liver biopsy of her fatty liver. The rounded edge of segment IVb was accessible and a wedge was excised using curved scissors to obtain an adequate specimen for evaluation. No energy was used during the biopsy. Photos were taken of the liver and the biopsy site for documentation. The specimen was retrieved using the large cupped grasper and placed in formalin. The site was made hemostatic using the monopolar energy device set to 70 das. The gallbladder in pouch was then retracted out through the umbilical trocar site with the camera placed temporarily through the epigastric trocar site.  The gallbladder fossa was inspected. No bile leaks were seen, the clips on the artery and the Endoloop on the duct were intact and hemostatsis confirmed. A #15 Denys drain was brought into the abdomen and placed in the subhepatic space. It was secured at the skin with 2-0 silk suture. Marcaine was infiltrated into the preperitoneal tissues around each trocar site. The fascia of the supraumbilical incision was closed with interrupted 0 vicryl suture. Subcuticular 4-0 Vicryl was used to close the skin incisions. The gallbladder was not further inspected on the back table. The wounds were dressed sterilely with Steri-Strips, 2 x 2's and tegaderm. All sponge, instruments, and needle counts were correct. The patient was taken to recovery in good condition.      Jeanne Tabor MD, FACS

## 2019-06-03 PROBLEM — K75.81 STEATOHEPATITIS, NON-ALCOHOLIC: Status: ACTIVE | Noted: 2019-06-03

## 2021-02-01 ENCOUNTER — TRANSCRIBE ORDER (OUTPATIENT)
Dept: SCHEDULING | Age: 61
End: 2021-02-01

## 2021-02-01 DIAGNOSIS — Z13.820 SCREENING FOR OSTEOPOROSIS: ICD-10-CM

## 2021-02-01 DIAGNOSIS — Z12.31 ENCOUNTER FOR SCREENING MAMMOGRAM FOR MALIGNANT NEOPLASM OF BREAST: Primary | ICD-10-CM

## 2021-09-16 ENCOUNTER — HOSPITAL ENCOUNTER (OUTPATIENT)
Dept: LAB | Age: 61
Discharge: HOME | End: 2021-09-16

## 2021-09-16 PROCEDURE — 88305 TISSUE EXAM BY PATHOLOGIST: CPT

## 2022-09-22 ENCOUNTER — APPOINTMENT (RX ONLY)
Dept: URBAN - NONMETROPOLITAN AREA CLINIC 1 | Facility: CLINIC | Age: 62
Setting detail: DERMATOLOGY
End: 2022-09-22

## 2022-09-22 DIAGNOSIS — D485 NEOPLASM OF UNCERTAIN BEHAVIOR OF SKIN: ICD-10-CM

## 2022-09-22 DIAGNOSIS — L71.8 OTHER ROSACEA: ICD-10-CM | Status: INADEQUATELY CONTROLLED

## 2022-09-22 DIAGNOSIS — L82.0 INFLAMED SEBORRHEIC KERATOSIS: ICD-10-CM

## 2022-09-22 PROBLEM — D48.5 NEOPLASM OF UNCERTAIN BEHAVIOR OF SKIN: Status: ACTIVE | Noted: 2022-09-22

## 2022-09-22 PROCEDURE — ? PRESCRIPTION MEDICATION MANAGEMENT

## 2022-09-22 PROCEDURE — ? FULL BODY SKIN EXAM - DECLINED

## 2022-09-22 PROCEDURE — 17110 DESTRUCTION B9 LES UP TO 14: CPT

## 2022-09-22 PROCEDURE — 11102 TANGNTL BX SKIN SINGLE LES: CPT | Mod: 59

## 2022-09-22 PROCEDURE — ? LIQUID NITROGEN

## 2022-09-22 PROCEDURE — ? COUNSELING

## 2022-09-22 PROCEDURE — 99204 OFFICE O/P NEW MOD 45 MIN: CPT | Mod: 25

## 2022-09-22 PROCEDURE — ? BIOPSY BY SHAVE METHOD

## 2022-09-22 PROCEDURE — ? PRESCRIPTION

## 2022-09-22 RX ORDER — METRONIDAZOLE 7.5 MG/G
GEL TOPICAL
Qty: 45 | Refills: 5 | Status: ERX | COMMUNITY
Start: 2022-09-22

## 2022-09-22 RX ADMIN — METRONIDAZOLE: 7.5 GEL TOPICAL at 00:00

## 2022-09-22 ASSESSMENT — LOCATION SIMPLE DESCRIPTION DERM
LOCATION SIMPLE: ABDOMEN
LOCATION SIMPLE: LEFT CHEEK
LOCATION SIMPLE: RIGHT CHEEK

## 2022-09-22 ASSESSMENT — LOCATION DETAILED DESCRIPTION DERM
LOCATION DETAILED: LEFT CENTRAL MALAR CHEEK
LOCATION DETAILED: LEFT INFERIOR MEDIAL MALAR CHEEK
LOCATION DETAILED: RIGHT INFERIOR CENTRAL MALAR CHEEK
LOCATION DETAILED: LEFT RIB CAGE

## 2022-09-22 ASSESSMENT — LOCATION ZONE DERM
LOCATION ZONE: FACE
LOCATION ZONE: TRUNK

## 2022-09-22 NOTE — PROCEDURE: BIOPSY BY SHAVE METHOD
Detail Level: Detailed
Depth Of Biopsy: dermis
Was A Bandage Applied: Yes
Size Of Lesion In Cm: 0.5
X Size Of Lesion In Cm: 0
Biopsy Type: H and E
Biopsy Method: Personna blade
Anesthesia Type: 1% lidocaine with epinephrine and a 1:10 solution of 8.4% sodium bicarbonate
Hemostasis: Aluminum Chloride
Wound Care: Petrolatum
Dressing: bandage
Destruction After The Procedure: No
Type Of Destruction Used: Curettage
Curettage Text: The wound bed was treated with curettage after the biopsy was performed.
Cryotherapy Text: The wound bed was treated with cryotherapy after the biopsy was performed.
Electrodesiccation Text: The wound bed was treated with electrodesiccation after the biopsy was performed.
Electrodesiccation And Curettage Text: The wound bed was treated with electrodesiccation and curettage after the biopsy was performed.
Silver Nitrate Text: The wound bed was treated with silver nitrate after the biopsy was performed.
Lab: -97
Consent: Written consent was obtained and risk was reviewed  per signed  consent form. Biopsy was performed with patient verbal permission.
Post-care instructions were given and  reviewed with the patient in detail. Patient is to keep the biopsy site dry overnight, and then apply healing ointment daily until healed. Patient is instructed to call for any questions or problems.
Notification Instructions: Patient will be notified of biopsy results. However, patient instructed to call the office if not contacted within 2 weeks.
Billing Type: Third-Party Bill
Information: Selecting Yes will display possible errors in your note based on the variables you have selected. This validation is only offered as a suggestion for you. PLEASE NOTE THAT THE VALIDATION TEXT WILL BE REMOVED WHEN YOU FINALIZE YOUR NOTE. IF YOU WANT TO FAX A PRELIMINARY NOTE YOU WILL NEED TO TOGGLE THIS TO 'NO' IF YOU DO NOT WANT IT IN YOUR FAXED NOTE.

## 2022-09-22 NOTE — PROCEDURE: LIQUID NITROGEN
Include Z78.9 (Other Specified Conditions Influencing Health Status) As An Associated Diagnosis?: No
Consent: The patient's consent was obtained including but not limited to risks of crusting, scabbing, blistering, scarring, darker or lighter pigmentary change, recurrence, incomplete removal and infection.
Show Topical Anesthesia Variable?: Yes
Medical Necessity Clause: This procedure was medically necessary because the lesions that were treated were:
Number Of Freeze-Thaw Cycles: 2 freeze-thaw cycles
Post-Care Instructions: I reviewed with the patient in detail post-care instructions. Patient is to wear sunprotection, and avoid picking at any of the treated lesions. Pt may apply Vaseline to crusted or scabbing areas.
Medical Necessity Information: It is in your best interest to select a reason for this procedure from the list below. All of these items fulfill various CMS LCD requirements except the new and changing color options.
Spray Paint Text: The liquid nitrogen was applied to the skin utilizing a spray paint frosting technique.
Detail Level: Detailed

## (undated) DEVICE — TELFA NON-ADHERENT ABSORBENT DRESSING: Brand: TELFA

## (undated) DEVICE — UNIVERSAL FIXATION CANNULA: Brand: VERSAONE

## (undated) DEVICE — NDL PRT INJ NSAF BLNT 18GX1.5 --

## (undated) DEVICE — REM POLYHESIVE ADULT PATIENT RETURN ELECTRODE: Brand: VALLEYLAB

## (undated) DEVICE — CONTAINER SPEC FRMLN 120ML --

## (undated) DEVICE — GUIDEWIRE ENDOSCP L260CM DIA0.035IN STD BILI HYDRPHLC EXT

## (undated) DEVICE — SYR 3ML LL TIP 1/10ML GRAD --

## (undated) DEVICE — 3M™ TEGADERM™ TRANSPARENT FILM DRESSING FRAME STYLE, 1624W, 2-3/8 IN X 2-3/4 IN (6 CM X 7 CM), 100/CT 4CT/CASE: Brand: 3M™ TEGADERM™

## (undated) DEVICE — AMD ANTIMICROBIAL GAUZE SPONGES 8 PLY USP TYPE VII: Brand: CURITY

## (undated) DEVICE — CANNULA NSL ORAL AD FOR CAPNOFLEX CO2 O2 AIRLFE

## (undated) DEVICE — DEVICE LCK BILI RAP EXCHG OLPS --

## (undated) DEVICE — (D)PREP SKN CHLRAPRP APPL 26ML -- CONVERT TO ITEM 371833

## (undated) DEVICE — BLADELESS OPTICAL TROCAR WITH FIXATION CANNULA: Brand: VERSAPORT

## (undated) DEVICE — BLOCK BITE AD 60FR W/ VELC STRP ADDRESSES MOST PT AND

## (undated) DEVICE — 2, DISPOSABLE SUCTION/IRRIGATOR WITHOUT DISPOSABLE TIP: Brand: STRYKEFLOW

## (undated) DEVICE — ADULT SPO2 SENSOR: Brand: NELLCOR

## (undated) DEVICE — CLIP APPLIER: Brand: ENDO CLIP II

## (undated) DEVICE — RETRIEVAL BALLOON CATHETER: Brand: EXTRACTOR™ PRO RX

## (undated) DEVICE — APPLIER LIG CLP L13IN 10MM PSTL GRP CONTAIN 15 TI L CLP

## (undated) DEVICE — SUTURE PERMAHAND SZ 2-0 L18IN NONABSORBABLE BLK L26MM PS 1588H

## (undated) DEVICE — BAG SPEC RETRV 275ML 10ML DISPOSABLE RELIACATCH

## (undated) DEVICE — 2000CC GUARDIAN II: Brand: GUARDIAN

## (undated) DEVICE — SYR 5ML 1/5 GRAD LL NSAF LF --

## (undated) DEVICE — SOL ANTI-FOG 6ML MEDC -- MEDICHOICE - CONVERT TO 358427

## (undated) DEVICE — BLUNT TIP TROCAR: Brand: AUTO SUTURE

## (undated) DEVICE — [HIGH FLOW INSUFFLATOR,  DO NOT USE IF PACKAGE IS DAMAGED,  KEEP DRY,  KEEP AWAY FROM SUNLIGHT,  PROTECT FROM HEAT AND RADIOACTIVE SOURCES.]: Brand: PNEUMOSURE

## (undated) DEVICE — KENDALL SCD EXPRESS SLEEVES, KNEE LENGTH, MEDIUM: Brand: KENDALL SCD

## (undated) DEVICE — (D)SYR 10ML 1/5ML GRAD NSAF -- PKGING CHANGE USE ITEM 338027

## (undated) DEVICE — KENDALL RADIOLUCENT FOAM MONITORING ELECTRODE RECTANGULAR SHAPE: Brand: KENDALL

## (undated) DEVICE — SUTURE SZ 0 27IN 5/8 CIR UR-6  TAPER PT VIOLET ABSRB VICRYL J603H

## (undated) DEVICE — TRAY CATH 16F URIN MTR LTX -- CONVERT TO ITEM 363111

## (undated) DEVICE — LOGICUT SCISSOR LENGTH 320MM: Brand: LOGI - LAPAROSCOPIC INSTRUMENT SYSTEM

## (undated) DEVICE — STANDARD HYPODERMIC NEEDLE,POLYPROPYLENE HUB: Brand: MONOJECT

## (undated) DEVICE — Device

## (undated) DEVICE — ENDOLOOP SUT LIGATURE 18IN -- 12/BX PDS II

## (undated) DEVICE — SPHINCTEROTOME: Brand: JAGTOME RX 44

## (undated) DEVICE — LAP CHOLE: Brand: MEDLINE INDUSTRIES, INC.

## (undated) DEVICE — SUTURE VCRL SZ 4-0 L27IN ABSRB UD L19MM PS-2 3/8 CIR PRIM J426H

## (undated) DEVICE — TRAY CATH 16F DRN BG LTX -- CONVERT TO ITEM 363158